# Patient Record
Sex: FEMALE | Race: WHITE | Employment: PART TIME | URBAN - METROPOLITAN AREA
[De-identification: names, ages, dates, MRNs, and addresses within clinical notes are randomized per-mention and may not be internally consistent; named-entity substitution may affect disease eponyms.]

---

## 2018-04-19 VITALS
TEMPERATURE: 97 F | WEIGHT: 267 LBS | BODY MASS INDEX: 47.3 KG/M2 | SYSTOLIC BLOOD PRESSURE: 130 MMHG | HEART RATE: 80 BPM | DIASTOLIC BLOOD PRESSURE: 90 MMHG

## 2018-04-19 RX ORDER — SERTRALINE HYDROCHLORIDE 100 MG/1
100 TABLET, FILM COATED ORAL DAILY
COMMUNITY
End: 2019-01-13

## 2018-12-03 ENCOUNTER — TELEPHONE (OUTPATIENT)
Dept: PRIMARY CARE CLINIC | Age: 48
End: 2018-12-03

## 2018-12-03 NOTE — TELEPHONE ENCOUNTER
Patient called requesting labs written up so she can schedule an appointment and won't have to come twice.     Please notify if  approved so she can get them done

## 2018-12-04 DIAGNOSIS — Z00.00 WELLNESS EXAMINATION: Primary | ICD-10-CM

## 2019-01-13 ENCOUNTER — HOSPITAL ENCOUNTER (EMERGENCY)
Age: 49
Discharge: HOME | End: 2019-01-13
Attending: EMERGENCY MEDICINE
Payer: COMMERCIAL

## 2019-01-13 ENCOUNTER — APPOINTMENT (OUTPATIENT)
Dept: GENERAL RADIOLOGY | Age: 49
End: 2019-01-13
Payer: COMMERCIAL

## 2019-01-13 VITALS
DIASTOLIC BLOOD PRESSURE: 85 MMHG | TEMPERATURE: 98.7 F | RESPIRATION RATE: 18 BRPM | SYSTOLIC BLOOD PRESSURE: 127 MMHG | OXYGEN SATURATION: 97 % | HEART RATE: 70 BPM | WEIGHT: 260 LBS | HEIGHT: 63 IN | BODY MASS INDEX: 46.07 KG/M2

## 2019-01-13 DIAGNOSIS — R05.9 COUGH: ICD-10-CM

## 2019-01-13 DIAGNOSIS — J43.9 PULMONARY EMPHYSEMA, UNSPECIFIED EMPHYSEMA TYPE (HCC): ICD-10-CM

## 2019-01-13 DIAGNOSIS — J40 BRONCHITIS: Primary | ICD-10-CM

## 2019-01-13 LAB
ALBUMIN SERPL-MCNC: 3.6 G/DL (ref 3.5–5.2)
ALP BLD-CCNC: 94 U/L (ref 35–104)
ALT SERPL-CCNC: 11 U/L (ref 0–32)
AMYLASE: 44 U/L (ref 20–100)
ANION GAP SERPL CALCULATED.3IONS-SCNC: 13 MMOL/L (ref 7–16)
AST SERPL-CCNC: 21 U/L (ref 0–31)
B.E.: 3.6 MMOL/L (ref -3–3)
BILIRUB SERPL-MCNC: 0.3 MG/DL (ref 0–1.2)
BILIRUBIN DIRECT: <0.2 MG/DL (ref 0–0.3)
BILIRUBIN, INDIRECT: NORMAL MG/DL (ref 0–1)
BUN BLDV-MCNC: 12 MG/DL (ref 6–20)
CALCIUM SERPL-MCNC: 8.8 MG/DL (ref 8.6–10.2)
CHLORIDE BLD-SCNC: 101 MMOL/L (ref 98–107)
CK MB: <1 NG/ML (ref 0–4.3)
CO2: 24 MMOL/L (ref 22–29)
CREAT SERPL-MCNC: 0.7 MG/DL (ref 0.5–1)
D DIMER: 236 NG/ML DDU
DELIVERY SYSTEMS: ABNORMAL
DEVICE: ABNORMAL
EKG ATRIAL RATE: 65 BPM
EKG P AXIS: 20 DEGREES
EKG P-R INTERVAL: 134 MS
EKG Q-T INTERVAL: 404 MS
EKG QRS DURATION: 86 MS
EKG QTC CALCULATION (BAZETT): 420 MS
EKG R AXIS: 62 DEGREES
EKG T AXIS: 50 DEGREES
EKG VENTRICULAR RATE: 65 BPM
FIO2 ARTERIAL: 4
GFR AFRICAN AMERICAN: >60
GFR NON-AFRICAN AMERICAN: >60 ML/MIN/1.73
GLUCOSE BLD-MCNC: 144 MG/DL (ref 74–99)
HCO3 ARTERIAL: 27.6 MMOL/L (ref 22–26)
HCT VFR BLD CALC: 48.1 % (ref 34–48)
HEMOGLOBIN: 16 G/DL (ref 11.5–15.5)
LACTIC ACID: 1.3 MMOL/L (ref 0.5–2.2)
LIPASE: 25 U/L (ref 13–60)
MCH RBC QN AUTO: 30.4 PG (ref 26–35)
MCHC RBC AUTO-ENTMCNC: 33.3 % (ref 32–34.5)
MCV RBC AUTO: 91.3 FL (ref 80–99.9)
O2 SATURATION: 99.6 % (ref 92–98.5)
OPERATOR ID: 377
PCO2 ARTERIAL: 38.7 MMHG (ref 35–45)
PDW BLD-RTO: 12.4 FL (ref 11.5–15)
PH BLOOD GAS: 7.46 (ref 7.35–7.45)
PLATELET # BLD: 326 E9/L (ref 130–450)
PMV BLD AUTO: 9.5 FL (ref 7–12)
PO2 ARTERIAL: 171.9 MMHG (ref 80–100)
POTASSIUM SERPL-SCNC: 4 MMOL/L (ref 3.5–5)
PRO-BNP: 44 PG/ML (ref 0–125)
RBC # BLD: 5.27 E12/L (ref 3.5–5.5)
SODIUM BLD-SCNC: 138 MMOL/L (ref 132–146)
SOURCE, BLOOD GAS: ABNORMAL
TOTAL CK: 74 U/L (ref 20–180)
TOTAL PROTEIN: 7.3 G/DL (ref 6.4–8.3)
TROPONIN: <0.01 NG/ML (ref 0–0.03)
WBC # BLD: 8.6 E9/L (ref 4.5–11.5)

## 2019-01-13 PROCEDURE — 85378 FIBRIN DEGRADE SEMIQUANT: CPT

## 2019-01-13 PROCEDURE — 96365 THER/PROPH/DIAG IV INF INIT: CPT

## 2019-01-13 PROCEDURE — 93005 ELECTROCARDIOGRAM TRACING: CPT | Performed by: EMERGENCY MEDICINE

## 2019-01-13 PROCEDURE — 82150 ASSAY OF AMYLASE: CPT

## 2019-01-13 PROCEDURE — 80048 BASIC METABOLIC PNL TOTAL CA: CPT

## 2019-01-13 PROCEDURE — 83605 ASSAY OF LACTIC ACID: CPT

## 2019-01-13 PROCEDURE — 82803 BLOOD GASES ANY COMBINATION: CPT

## 2019-01-13 PROCEDURE — 82553 CREATINE MB FRACTION: CPT

## 2019-01-13 PROCEDURE — 6360000002 HC RX W HCPCS: Performed by: EMERGENCY MEDICINE

## 2019-01-13 PROCEDURE — 96375 TX/PRO/DX INJ NEW DRUG ADDON: CPT

## 2019-01-13 PROCEDURE — 84484 ASSAY OF TROPONIN QUANT: CPT

## 2019-01-13 PROCEDURE — 83880 ASSAY OF NATRIURETIC PEPTIDE: CPT

## 2019-01-13 PROCEDURE — 2580000003 HC RX 258: Performed by: EMERGENCY MEDICINE

## 2019-01-13 PROCEDURE — 71045 X-RAY EXAM CHEST 1 VIEW: CPT

## 2019-01-13 PROCEDURE — 82550 ASSAY OF CK (CPK): CPT

## 2019-01-13 PROCEDURE — 6370000000 HC RX 637 (ALT 250 FOR IP): Performed by: EMERGENCY MEDICINE

## 2019-01-13 PROCEDURE — 36600 WITHDRAWAL OF ARTERIAL BLOOD: CPT

## 2019-01-13 PROCEDURE — 94640 AIRWAY INHALATION TREATMENT: CPT

## 2019-01-13 PROCEDURE — 80076 HEPATIC FUNCTION PANEL: CPT

## 2019-01-13 PROCEDURE — 83690 ASSAY OF LIPASE: CPT

## 2019-01-13 PROCEDURE — 36415 COLL VENOUS BLD VENIPUNCTURE: CPT

## 2019-01-13 PROCEDURE — 99284 EMERGENCY DEPT VISIT MOD MDM: CPT

## 2019-01-13 PROCEDURE — 87040 BLOOD CULTURE FOR BACTERIA: CPT

## 2019-01-13 PROCEDURE — 85027 COMPLETE CBC AUTOMATED: CPT

## 2019-01-13 PROCEDURE — 96366 THER/PROPH/DIAG IV INF ADDON: CPT

## 2019-01-13 RX ORDER — IPRATROPIUM BROMIDE AND ALBUTEROL SULFATE 2.5; .5 MG/3ML; MG/3ML
1 SOLUTION RESPIRATORY (INHALATION)
Status: COMPLETED | OUTPATIENT
Start: 2019-01-13 | End: 2019-01-13

## 2019-01-13 RX ORDER — METHYLPREDNISOLONE SODIUM SUCCINATE 125 MG/2ML
125 INJECTION, POWDER, LYOPHILIZED, FOR SOLUTION INTRAMUSCULAR; INTRAVENOUS ONCE
Status: COMPLETED | OUTPATIENT
Start: 2019-01-13 | End: 2019-01-13

## 2019-01-13 RX ORDER — SODIUM CHLORIDE 9 MG/ML
1000 INJECTION, SOLUTION INTRAVENOUS ONCE
Status: COMPLETED | OUTPATIENT
Start: 2019-01-13 | End: 2019-01-13

## 2019-01-13 RX ORDER — LEVOFLOXACIN 500 MG/1
500 TABLET, FILM COATED ORAL DAILY
Qty: 7 TABLET | Refills: 0 | Status: SHIPPED | OUTPATIENT
Start: 2019-01-13 | End: 2019-01-23

## 2019-01-13 RX ORDER — METHYLPREDNISOLONE 4 MG/1
TABLET ORAL
Qty: 1 KIT | Status: SHIPPED | OUTPATIENT
Start: 2019-01-13 | End: 2019-01-19

## 2019-01-13 RX ORDER — ACETAMINOPHEN AND CODEINE PHOSPHATE 300; 30 MG/1; MG/1
1-2 TABLET ORAL EVERY 4 HOURS PRN
Qty: 21 TABLET | Refills: 0 | Status: SHIPPED | OUTPATIENT
Start: 2019-01-13 | End: 2019-01-16

## 2019-01-13 RX ORDER — KETOROLAC TROMETHAMINE 30 MG/ML
30 INJECTION, SOLUTION INTRAMUSCULAR; INTRAVENOUS ONCE
Status: COMPLETED | OUTPATIENT
Start: 2019-01-13 | End: 2019-01-13

## 2019-01-13 RX ORDER — ALBUTEROL SULFATE 90 UG/1
2 AEROSOL, METERED RESPIRATORY (INHALATION) EVERY 4 HOURS PRN
Qty: 1 INHALER | Status: ON HOLD | OUTPATIENT
Start: 2019-01-13 | End: 2020-04-12

## 2019-01-13 RX ORDER — ONDANSETRON 2 MG/ML
4 INJECTION INTRAMUSCULAR; INTRAVENOUS ONCE
Status: COMPLETED | OUTPATIENT
Start: 2019-01-13 | End: 2019-01-13

## 2019-01-13 RX ORDER — MAGNESIUM SULFATE IN WATER 40 MG/ML
2 INJECTION, SOLUTION INTRAVENOUS ONCE
Status: COMPLETED | OUTPATIENT
Start: 2019-01-13 | End: 2019-01-13

## 2019-01-13 RX ADMIN — ONDANSETRON 4 MG: 2 INJECTION INTRAMUSCULAR; INTRAVENOUS at 21:21

## 2019-01-13 RX ADMIN — METHYLPREDNISOLONE SODIUM SUCCINATE 125 MG: 125 INJECTION, POWDER, FOR SOLUTION INTRAMUSCULAR; INTRAVENOUS at 21:21

## 2019-01-13 RX ADMIN — IPRATROPIUM BROMIDE AND ALBUTEROL SULFATE 1 AMPULE: .5; 3 SOLUTION RESPIRATORY (INHALATION) at 21:26

## 2019-01-13 RX ADMIN — IPRATROPIUM BROMIDE AND ALBUTEROL SULFATE 1 AMPULE: .5; 3 SOLUTION RESPIRATORY (INHALATION) at 21:20

## 2019-01-13 RX ADMIN — KETOROLAC TROMETHAMINE 30 MG: 30 INJECTION, SOLUTION INTRAMUSCULAR; INTRAVENOUS at 22:58

## 2019-01-13 RX ADMIN — MAGNESIUM SULFATE HEPTAHYDRATE 2 G: 40 INJECTION, SOLUTION INTRAVENOUS at 21:21

## 2019-01-13 RX ADMIN — SODIUM CHLORIDE 1000 ML: 9 INJECTION, SOLUTION INTRAVENOUS at 21:21

## 2019-01-13 RX ADMIN — IPRATROPIUM BROMIDE AND ALBUTEROL SULFATE 1 AMPULE: .5; 3 SOLUTION RESPIRATORY (INHALATION) at 21:21

## 2019-01-13 ASSESSMENT — ENCOUNTER SYMPTOMS
WHEEZING: 1
EYE REDNESS: 0
SINUS PRESSURE: 0
EYE PAIN: 0
VOMITING: 0
BACK PAIN: 0
NAUSEA: 0
SORE THROAT: 0
DIARRHEA: 0
EYE DISCHARGE: 0
SINUS CONGESTION: 0
ABDOMINAL DISTENTION: 0
COUGH: 1
RHINORRHEA: 0
SHORTNESS OF BREATH: 0

## 2019-01-13 ASSESSMENT — PAIN DESCRIPTION - LOCATION: LOCATION: CHEST

## 2019-01-13 ASSESSMENT — PAIN SCALES - GENERAL
PAINLEVEL_OUTOF10: 8
PAINLEVEL_OUTOF10: 8

## 2019-01-13 ASSESSMENT — PAIN DESCRIPTION - PAIN TYPE: TYPE: ACUTE PAIN

## 2019-01-19 LAB — CULTURE, BLOOD 2: NORMAL

## 2019-02-12 PROBLEM — R05.9 COUGH: Status: RESOLVED | Noted: 2019-01-13 | Resolved: 2019-02-12

## 2019-06-06 ENCOUNTER — OFFICE VISIT (OUTPATIENT)
Dept: PRIMARY CARE CLINIC | Age: 49
End: 2019-06-06
Payer: COMMERCIAL

## 2019-06-06 VITALS
WEIGHT: 260 LBS | TEMPERATURE: 97.8 F | BODY MASS INDEX: 46.06 KG/M2 | DIASTOLIC BLOOD PRESSURE: 80 MMHG | HEART RATE: 82 BPM | SYSTOLIC BLOOD PRESSURE: 110 MMHG

## 2019-06-06 DIAGNOSIS — Z12.11 SCREENING FOR COLON CANCER: ICD-10-CM

## 2019-06-06 DIAGNOSIS — F17.210 CIGARETTE NICOTINE DEPENDENCE WITHOUT COMPLICATION: ICD-10-CM

## 2019-06-06 DIAGNOSIS — M54.12 CERVICAL RADICULOPATHY: ICD-10-CM

## 2019-06-06 DIAGNOSIS — Z12.39 SCREENING FOR BREAST CANCER: Primary | ICD-10-CM

## 2019-06-06 DIAGNOSIS — R05.3 CHRONIC COUGH: ICD-10-CM

## 2019-06-06 DIAGNOSIS — J43.9 PULMONARY EMPHYSEMA, UNSPECIFIED EMPHYSEMA TYPE (HCC): ICD-10-CM

## 2019-06-06 PROCEDURE — G8417 CALC BMI ABV UP PARAM F/U: HCPCS | Performed by: FAMILY MEDICINE

## 2019-06-06 PROCEDURE — G8926 SPIRO NO PERF OR DOC: HCPCS | Performed by: FAMILY MEDICINE

## 2019-06-06 PROCEDURE — G8427 DOCREV CUR MEDS BY ELIG CLIN: HCPCS | Performed by: FAMILY MEDICINE

## 2019-06-06 PROCEDURE — 3023F SPIROM DOC REV: CPT | Performed by: FAMILY MEDICINE

## 2019-06-06 PROCEDURE — 96160 PT-FOCUSED HLTH RISK ASSMT: CPT | Performed by: FAMILY MEDICINE

## 2019-06-06 PROCEDURE — 4004F PT TOBACCO SCREEN RCVD TLK: CPT | Performed by: FAMILY MEDICINE

## 2019-06-06 PROCEDURE — 99214 OFFICE O/P EST MOD 30 MIN: CPT | Performed by: FAMILY MEDICINE

## 2019-06-06 PROCEDURE — 3017F COLORECTAL CA SCREEN DOC REV: CPT | Performed by: FAMILY MEDICINE

## 2019-06-06 RX ORDER — AMOXICILLIN AND CLAVULANATE POTASSIUM 875; 125 MG/1; MG/1
1 TABLET, FILM COATED ORAL 2 TIMES DAILY
Qty: 14 TABLET | Refills: 0 | Status: SHIPPED | OUTPATIENT
Start: 2019-06-06 | End: 2019-06-13

## 2019-06-06 RX ORDER — DEXTROMETHORPHAN HYDROBROMIDE AND PROMETHAZINE HYDROCHLORIDE 15; 6.25 MG/5ML; MG/5ML
5 SYRUP ORAL 4 TIMES DAILY PRN
Qty: 240 ML | Refills: 0 | Status: SHIPPED | OUTPATIENT
Start: 2019-06-06 | End: 2019-06-13

## 2019-06-06 SDOH — HEALTH STABILITY: MENTAL HEALTH: HOW OFTEN DO YOU HAVE A DRINK CONTAINING ALCOHOL?: MONTHLY OR LESS

## 2019-06-06 SDOH — HEALTH STABILITY: MENTAL HEALTH: HOW MANY STANDARD DRINKS CONTAINING ALCOHOL DO YOU HAVE ON A TYPICAL DAY?: 1 OR 2

## 2019-06-06 ASSESSMENT — PATIENT HEALTH QUESTIONNAIRE - PHQ9
7. TROUBLE CONCENTRATING ON THINGS, SUCH AS READING THE NEWSPAPER OR WATCHING TELEVISION: 0
SUM OF ALL RESPONSES TO PHQ9 QUESTIONS 1 & 2: 6
5. POOR APPETITE OR OVEREATING: 1
3. TROUBLE FALLING OR STAYING ASLEEP: 1
2. FEELING DOWN, DEPRESSED OR HOPELESS: 3
10. IF YOU CHECKED OFF ANY PROBLEMS, HOW DIFFICULT HAVE THESE PROBLEMS MADE IT FOR YOU TO DO YOUR WORK, TAKE CARE OF THINGS AT HOME, OR GET ALONG WITH OTHER PEOPLE: 0
SUM OF ALL RESPONSES TO PHQ QUESTIONS 1-9: 12
SUM OF ALL RESPONSES TO PHQ QUESTIONS 1-9: 12
4. FEELING TIRED OR HAVING LITTLE ENERGY: 3
1. LITTLE INTEREST OR PLEASURE IN DOING THINGS: 3
6. FEELING BAD ABOUT YOURSELF - OR THAT YOU ARE A FAILURE OR HAVE LET YOURSELF OR YOUR FAMILY DOWN: 1
9. THOUGHTS THAT YOU WOULD BE BETTER OFF DEAD, OR OF HURTING YOURSELF: 0
8. MOVING OR SPEAKING SO SLOWLY THAT OTHER PEOPLE COULD HAVE NOTICED. OR THE OPPOSITE, BEING SO FIGETY OR RESTLESS THAT YOU HAVE BEEN MOVING AROUND A LOT MORE THAN USUAL: 0

## 2019-06-06 ASSESSMENT — ENCOUNTER SYMPTOMS
GASTROINTESTINAL NEGATIVE: 1
ALLERGIC/IMMUNOLOGIC NEGATIVE: 1
EYES NEGATIVE: 1
COUGH: 1

## 2019-06-06 NOTE — PATIENT INSTRUCTIONS
Take the medication as prescribed  Go to [facility] smoking program at [facility]  Low-sodium low-fat weight reduction diet  Force fluids  .  X-ray and lab work done  Warm compresses to the neck  Return to clinic earlier if any problems

## 2019-06-06 NOTE — PROGRESS NOTES
OFFICE PROGRESS NOTE      SUBJECTIVE:        Patient ID:   Delaney Pelletier is a 47 y.o. female who presents for   Chief Complaint   Patient presents with    Arm Pain     C/o intermittent Lt arm pain with heavy sensation x 1.5 weeks    Cough     C/o a productive cough x 1 month. HPI:   Complaining of cough  Complaining of left arm pain starting from the neck  Complaining of neck pain  Still continues to smoke  Does not follow the diet doesn't exercise  Patient did not follow-up in the office  She keeps on going to the emergency room were normal on          Prior to Admission medications    Medication Sig Start Date End Date Taking? Authorizing Provider   amoxicillin-clavulanate (AUGMENTIN) 875-125 MG per tablet Take 1 tablet by mouth 2 times daily for 7 days 6/6/19 6/13/19 Yes Robyn Felty, MD   promethazine-dextromethorphan (PROMETHAZINE-DM) 6.25-15 MG/5ML syrup Take 5 mLs by mouth 4 times daily as needed for Cough 6/6/19 6/13/19 Yes Robyn Felty, MD   albuterol sulfate HFA (PROVENTIL HFA) 108 (90 Base) MCG/ACT inhaler Inhale 2 puffs into the lungs every 4 hours as needed for Wheezing DISPENSE SPACER IF INSURANCE COVERS 1/13/19 1/13/20 Yes Angie Woodward MD        Social History     Socioeconomic History    Marital status: Single     Spouse name: None    Number of children: None    Years of education: None    Highest education level: None   Occupational History    None   Social Needs    Financial resource strain: None    Food insecurity:     Worry: None     Inability: None    Transportation needs:     Medical: None     Non-medical: None   Tobacco Use    Smoking status: Current Every Day Smoker     Packs/day: 0.50     Years: 28.00     Pack years: 14.00     Types: Cigarettes    Smokeless tobacco: Never Used   Substance and Sexual Activity    Alcohol use:  Yes     Alcohol/week: 0.6 oz     Types: 1 Cans of beer per week     Frequency: Monthly or less     Drinks per session: 1 or 2     Binge frequency: Never     Comment: occasionally    Drug use: No    Sexual activity: Not Currently     Partners: Male   Lifestyle    Physical activity:     Days per week: None     Minutes per session: None    Stress: None   Relationships    Social connections:     Talks on phone: None     Gets together: None     Attends [employer] service: None     Active member of club or organization: None     Attends meetings of clubs or organizations: None     Relationship status: None    Intimate partner violence:     Fear of current or ex partner: None     Emotionally abused: None     Physically abused: None     Forced sexual activity: None   Other Topics Concern    None   Social History Narrative    None       I have reviewed Luh's allergies, medications, problem list, medical, social and family history and have updated as needed in the electronic medical record  Review Of Systems:    Review of Systems   Constitutional: Negative. HENT: Negative. Eyes: Negative. Respiratory: Positive for cough. Cardiovascular: Negative. Gastrointestinal: Negative. Endocrine: Negative. Musculoskeletal: Positive for neck pain. Skin: Negative. Allergic/Immunologic: Negative. Neurological: Positive for numbness. Hematological: Negative. Psychiatric/Behavioral: Negative. OBJECTIVE:     VS:  Wt Readings from Last 3 Encounters:   06/06/19 260 lb (117.9 kg)   01/13/19 260 lb (117.9 kg)   02/05/18 267 lb (121.1 kg)     Temp Readings from Last 3 Encounters:   06/06/19 97.8 °F (36.6 °C) (Oral)   01/13/19 98.7 °F (37.1 °C) (Oral)   02/05/18 97 °F (36.1 °C)     BP Readings from Last 3 Encounters:   06/06/19 110/80   01/13/19 127/85   02/05/18 (!) 130/90        Physical Exam   Constitutional: She is oriented to person, place, and time. She appears well-developed and well-nourished. HENT:   Head: Normocephalic and atraumatic.    Mouth/Throat: Oropharynx is clear and moist.   Eyes: Pupils are equal, round, and reactive to light. Conjunctivae are normal.   Neck: Normal range of motion. Neck supple. Cardiovascular: Normal rate, regular rhythm, normal heart sounds and intact distal pulses. Pulmonary/Chest: Effort normal. She has wheezes. She has rales. Abdominal: Soft. Bowel sounds are normal.   Musculoskeletal: Normal range of motion. She exhibits tenderness. Neurological: She is alert and oriented to person, place, and time. Skin: Skin is warm and dry. Psychiatric: Thought content normal.          Labs :    Lab Results   Component Value Date    WBC 8.6 01/13/2019    HGB 16.0 (H) 01/13/2019    HCT 48.1 (H) 01/13/2019     01/13/2019    CHOL 192 05/02/2014    TRIG 89 05/02/2014    HDL 62 05/02/2014    ALT 11 01/13/2019    AST 21 01/13/2019     01/13/2019    K 4.0 01/13/2019     01/13/2019    CREATININE 0.7 01/13/2019    BUN 12 01/13/2019    CO2 24 01/13/2019    TSH 1.250 05/02/2014     Lab Results   Component Value Date    COLORU Yellow 01/30/2018    NITRU Negative 01/30/2018    GLUCOSEU Negative 01/30/2018    KETUA Negative 01/30/2018    UROBILINOGEN 0.2 01/30/2018    BILIRUBINUR Negative 01/30/2018     No results found for: PSA, CEA, , [id number],       Controlled Substances Monitoring:                                    ASSESSMENT     Patient Active Problem List    Diagnosis Date Noted    Bronchitis 01/13/2019    Pulmonary emphysema ([facility] [address] 01/13/2019        Diagnosis:     ICD-10-CM    1. Screening for breast cancer Z12.31 CURT DIGITAL SCREENING AUGMENTED BILATERAL   2. Screening for colon cancer Z12.11 POCT Fecal Immunochemical Test (FIT)   3. Pulmonary emphysema, unspecified emphysema type (HCC) J43.9 XR CHEST (SINGLE VIEW FRONTAL)     CBC WITH AUTO DIFFERENTIAL     COMPREHENSIVE METABOLIC PANEL     LIPID PANEL   4. Cervical radiculopathy M54.12 XR CERVICAL SPINE (4-5 VIEWS)   5.  Chronic cough R05 XR CHEST (SINGLE VIEW FRONTAL)     CBC WITH AUTO DIFFERENTIAL   6. Cigarette nicotine dependence without complication E12.089 XR CHEST (SINGLE VIEW FRONTAL)       PLAN:   Augmentin  Present and DM 1 take from full 4 times a day  Weight reduction  Quit smoking program    Force fluids  Warm compresses to the neck  Return to clinic earlier if any problems  Quality & Risk Score Accuracy    Visit Dx:  J43.9 - Pulmonary emphysema, unspecified emphysema type ([address]  Assessment and plan:  Stable based upon symptoms and exam. Continue current treatment plan and follow up at least yearly. Last edited 06/06/19 15:13 EDT by Rafi Murphy MD           Patient Instructions   Take the medication as prescribed  [facility]  Low-sodium low-fat weight reduction diet  Force fluids  . X-ray and lab work done  Warm compresses to the neck  Return to clinic earlier if any problems      Return in about 1 week (around 6/13/2019). Ever Napier reviewed my findings and recommendations with Kelby Macdonald.     Electronicallysigned by Rafi Murphy MD on 6/6/19 at 3:06 PM

## 2019-06-13 ENCOUNTER — TELEPHONE (OUTPATIENT)
Dept: ADMINISTRATIVE | Age: 49
End: 2019-06-13

## 2020-04-11 ENCOUNTER — HOSPITAL ENCOUNTER (INPATIENT)
Age: 50
LOS: 5 days | Discharge: HOME HEALTH | DRG: 024 | End: 2020-04-16
Attending: EMERGENCY MEDICINE | Admitting: INTERNAL MEDICINE
Payer: COMMERCIAL

## 2020-04-11 ENCOUNTER — APPOINTMENT (OUTPATIENT)
Dept: CT IMAGING | Age: 50
DRG: 024 | End: 2020-04-11
Payer: COMMERCIAL

## 2020-04-11 ENCOUNTER — HOSPITAL ENCOUNTER (EMERGENCY)
Age: 50
Discharge: ANOTHER HOSPITAL | End: 2020-04-11
Attending: EMERGENCY MEDICINE
Payer: COMMERCIAL

## 2020-04-11 VITALS
HEART RATE: 88 BPM | RESPIRATION RATE: 16 BRPM | SYSTOLIC BLOOD PRESSURE: 151 MMHG | TEMPERATURE: 98 F | OXYGEN SATURATION: 96 % | DIASTOLIC BLOOD PRESSURE: 86 MMHG | WEIGHT: 254 LBS | BODY MASS INDEX: 44.99 KG/M2

## 2020-04-11 PROBLEM — Z72.0 TOBACCO ABUSE: Chronic | Status: ACTIVE | Noted: 2020-04-11

## 2020-04-11 PROBLEM — I63.9 CVA (CEREBRAL VASCULAR ACCIDENT) (HCC): Status: ACTIVE | Noted: 2020-04-11

## 2020-04-11 PROBLEM — I63.9 ACUTE CVA (CEREBROVASCULAR ACCIDENT) (HCC): Status: ACTIVE | Noted: 2020-04-11

## 2020-04-11 PROBLEM — J40 BRONCHITIS: Status: RESOLVED | Noted: 2019-01-13 | Resolved: 2020-04-11

## 2020-04-11 LAB
ALBUMIN SERPL-MCNC: 3.2 G/DL (ref 3.5–5.2)
ALP BLD-CCNC: 94 U/L (ref 35–104)
ALT SERPL-CCNC: 19 U/L (ref 0–32)
ANION GAP SERPL CALCULATED.3IONS-SCNC: 14 MMOL/L (ref 7–16)
AST SERPL-CCNC: 20 U/L (ref 0–31)
BILIRUB SERPL-MCNC: 0.4 MG/DL (ref 0–1.2)
BUN BLDV-MCNC: 10 MG/DL (ref 6–20)
CALCIUM SERPL-MCNC: 9.2 MG/DL (ref 8.6–10.2)
CHLORIDE BLD-SCNC: 103 MMOL/L (ref 98–107)
CHP ED QC CHECK: YES
CO2: 19 MMOL/L (ref 22–29)
CREAT SERPL-MCNC: 0.8 MG/DL (ref 0.5–1)
GFR AFRICAN AMERICAN: >60
GFR NON-AFRICAN AMERICAN: >60 ML/MIN/1.73
GLUCOSE BLD-MCNC: 104 MG/DL (ref 74–99)
GLUCOSE BLD-MCNC: 99 MG/DL
HCT VFR BLD CALC: 51.7 % (ref 34–48)
HEMOGLOBIN: 16.2 G/DL (ref 11.5–15.5)
INR BLD: 1
MCH RBC QN AUTO: 28.4 PG (ref 26–35)
MCHC RBC AUTO-ENTMCNC: 31.3 % (ref 32–34.5)
MCV RBC AUTO: 90.7 FL (ref 80–99.9)
METER GLUCOSE: 99 MG/DL (ref 74–99)
PDW BLD-RTO: 12.9 FL (ref 11.5–15)
PLATELET # BLD: 497 E9/L (ref 130–450)
PMV BLD AUTO: 9.3 FL (ref 7–12)
POTASSIUM SERPL-SCNC: 4.3 MMOL/L (ref 3.5–5)
PROTHROMBIN TIME: 11.6 SEC (ref 9.3–12.4)
RBC # BLD: 5.7 E12/L (ref 3.5–5.5)
SODIUM BLD-SCNC: 136 MMOL/L (ref 132–146)
TOTAL PROTEIN: 7.9 G/DL (ref 6.4–8.3)
TROPONIN: <0.01 NG/ML (ref 0–0.03)
WBC # BLD: 9.2 E9/L (ref 4.5–11.5)

## 2020-04-11 PROCEDURE — 96374 THER/PROPH/DIAG INJ IV PUSH: CPT

## 2020-04-11 PROCEDURE — 70450 CT HEAD/BRAIN W/O DYE: CPT

## 2020-04-11 PROCEDURE — 82962 GLUCOSE BLOOD TEST: CPT

## 2020-04-11 PROCEDURE — 93005 ELECTROCARDIOGRAM TRACING: CPT | Performed by: EMERGENCY MEDICINE

## 2020-04-11 PROCEDURE — 84484 ASSAY OF TROPONIN QUANT: CPT

## 2020-04-11 PROCEDURE — 6360000002 HC RX W HCPCS: Performed by: EMERGENCY MEDICINE

## 2020-04-11 PROCEDURE — 2060000000 HC ICU INTERMEDIATE R&B

## 2020-04-11 PROCEDURE — 99285 EMERGENCY DEPT VISIT HI MDM: CPT

## 2020-04-11 PROCEDURE — 85610 PROTHROMBIN TIME: CPT

## 2020-04-11 PROCEDURE — G0384 LEV 5 HOSP TYPE B ED VISIT: HCPCS

## 2020-04-11 PROCEDURE — 85027 COMPLETE CBC AUTOMATED: CPT

## 2020-04-11 PROCEDURE — 80053 COMPREHEN METABOLIC PANEL: CPT

## 2020-04-11 RX ORDER — FENTANYL CITRATE 50 UG/ML
50 INJECTION, SOLUTION INTRAMUSCULAR; INTRAVENOUS ONCE
Status: COMPLETED | OUTPATIENT
Start: 2020-04-11 | End: 2020-04-11

## 2020-04-11 RX ADMIN — FENTANYL CITRATE 50 MCG: 50 INJECTION, SOLUTION INTRAMUSCULAR; INTRAVENOUS at 22:20

## 2020-04-11 ASSESSMENT — ENCOUNTER SYMPTOMS
SHORTNESS OF BREATH: 0
ABDOMINAL DISTENTION: 0
SINUS PRESSURE: 0
EYE PAIN: 0
COUGH: 0
EYE REDNESS: 0
EYE DISCHARGE: 0
PHOTOPHOBIA: 0
SHORTNESS OF BREATH: 0
BACK PAIN: 0
COLOR CHANGE: 0
CONSTIPATION: 0
NAUSEA: 0
WHEEZING: 0
EYE PAIN: 0
COUGH: 0
NAUSEA: 0
DIARRHEA: 0
DIARRHEA: 0
SORE THROAT: 0
VOMITING: 0
ABDOMINAL DISTENTION: 0
FACIAL SWELLING: 0
EYE REDNESS: 0
RHINORRHEA: 0
CHEST TIGHTNESS: 0

## 2020-04-11 ASSESSMENT — PAIN SCALES - GENERAL: PAINLEVEL_OUTOF10: 10

## 2020-04-11 NOTE — ED NOTES
Patient was advised to go to the ED at [address] [place] [address] directly. Patients daughter was here for transport. Dr. Javier Welsh in the ED at [address] [place] [address] is aware of the patient.      Camila Johnson RN  04/11/20 1547

## 2020-04-11 NOTE — ED PROVIDER NOTES
Cardiovascular:      Rate and Rhythm: Normal rate and regular rhythm. Heart sounds: Normal heart sounds. No murmur. Pulmonary:      Effort: Pulmonary effort is normal. No respiratory distress. Breath sounds: Normal breath sounds. No wheezing or rales. Abdominal:      General: Bowel sounds are normal.      Palpations: Abdomen is soft. Tenderness: There is no abdominal tenderness. There is no guarding or rebound. Skin:     General: Skin is warm and dry. Neurological:      Mental Status: She is alert and oriented to person, place, and time. Cranial Nerves: No cranial nerve deficit. Coordination: Coordination normal.      Comments: Patient has limited function of right upper extremity extending from the elbow to the hand. She has unable to perform any  strength testing, or move her fingers. She has have limited sensation over the dermatomal planes, however does note sensation of deep pain. There is notable hyper reflexivity at the right forearm and hand, as well as at the right tibia  She is ambulatory, there is no noted gait disturbance. Procedures     MDM  Number of Diagnoses or Management Options  Cerebrovascular accident (CVA), unspecified mechanism [facility]):   Diagnosis management comments: 43-year-old female history of obesity and tobacco use presenting to the emergency department with complaint of numbness tingling and weakness in right upper extremity, as well as involvement of right lower extremity. Patient's last known well was 8 AM yesterday morning. Initial NIH stroke scale in department is 3. Physical exam was also notable for hyperreflexia in affected limbs. vital signs are stable aside from mild hypertension. Based on presentation there is significant concern for recent acute CVA. Based on the limitations of this facility further work-up will be required at a full complement emergency department.   The patient was transported to this ED by her daughter, tPA Criteria*  Inclusion criteria:  - Ischemic stroke onset within 3 hours of drug administration  - Age 25 or older  - No hemorrhage or non-stroke cause of deficit on CT  - Measurable deficit on NIH Stroke Scale    Exclusion criteria: If the patient. ...  - has minor or improving symptoms  - had seizure at onset of stroke  - has had another stroke or serious head trauma within the last 3 months  - has had major surgery within the last 14 days  - has known history of intracranial hemorrhage  - has sustained systolic blood pressure >096 mmHg  - has sustained diastolic blood pressure >399 mmHg  - requires aggressive treatment is necessary to lower their blood pressure  - has symptoms suggestive of subarachnoid hemorrhage  - has had GI or urinary tract hemorrhage within the last 21 days  - has had an arterial puncture at a non-compressible site within the last 7 days  - received heparin within the last 48 hours and has an elevated PTT  - has a prothrombin time (PT) >15 seconds  - has a platelet count <464,587 uL  - serum blood glucose is <50 mg/dL or >400 mg/dL    Relative Contraindications:  - NIH Stroke score >22  - Patient's CT shows evidence of large MCA territory infarction (>1/3 the MCA territory)    *Located within [facility] Policy Paper    --------------------------------------------- PAST HISTORY ---------------------------------------------  Past Medical History:  has a past medical history of Pulmonary emphysema ([facility] [address] Past Surgical History:  has a past surgical history that includes Cholecystectomy;  section; Tubal ligation; and Tubal ligation. Social History:  reports that she has been smoking cigarettes. She has a 14.00 pack-year smoking history. She has never used smokeless tobacco. She reports current alcohol use of about 1.0 standard drinks of alcohol per week. She reports that she does not use drugs.     Family History: family history includes Diabetes in her mother; High Blood Pressure in her mother; they are aware of the specific conditions for emergent return, as well as the importance of follow-up. New Prescriptions    No medications on file       Diagnosis:  1. Cerebrovascular accident (CVA), unspecified mechanism ([facility] [address]        Disposition:  Patient's disposition: Discharge to home  Patient's condition is stable.               Chidi King DO  Resident  04/11/20 7662

## 2020-04-12 ENCOUNTER — APPOINTMENT (OUTPATIENT)
Dept: CT IMAGING | Age: 50
DRG: 024 | End: 2020-04-12
Payer: COMMERCIAL

## 2020-04-12 PROBLEM — I65.22 SYMPTOMATIC STENOSIS OF LEFT CAROTID ARTERY: Status: ACTIVE | Noted: 2020-04-12

## 2020-04-12 PROBLEM — E78.5 HYPERLIPIDEMIA: Status: ACTIVE | Noted: 2020-04-12

## 2020-04-12 PROBLEM — E66.01 MORBID OBESITY WITH BMI OF 45.0-49.9, ADULT (HCC): Chronic | Status: ACTIVE | Noted: 2020-04-12

## 2020-04-12 LAB
ANION GAP SERPL CALCULATED.3IONS-SCNC: 13 MMOL/L (ref 7–16)
APTT: 34.2 SEC (ref 24.5–35.1)
APTT: 43.2 SEC (ref 24.5–35.1)
BUN BLDV-MCNC: 12 MG/DL (ref 6–20)
CALCIUM SERPL-MCNC: 9.1 MG/DL (ref 8.6–10.2)
CHLORIDE BLD-SCNC: 104 MMOL/L (ref 98–107)
CHOLESTEROL, TOTAL: 198 MG/DL (ref 0–199)
CO2: 25 MMOL/L (ref 22–29)
CREAT SERPL-MCNC: 0.9 MG/DL (ref 0.5–1)
EKG ATRIAL RATE: 68 BPM
EKG P AXIS: 65 DEGREES
EKG P-R INTERVAL: 148 MS
EKG Q-T INTERVAL: 402 MS
EKG QRS DURATION: 86 MS
EKG QTC CALCULATION (BAZETT): 427 MS
EKG R AXIS: 80 DEGREES
EKG T AXIS: 45 DEGREES
EKG VENTRICULAR RATE: 68 BPM
GFR AFRICAN AMERICAN: >60
GFR NON-AFRICAN AMERICAN: >60 ML/MIN/1.73
GLUCOSE BLD-MCNC: 113 MG/DL (ref 74–99)
HBA1C MFR BLD: 5.5 % (ref 4–5.6)
HCT VFR BLD CALC: 47.4 % (ref 34–48)
HCT VFR BLD CALC: 47.8 % (ref 34–48)
HDLC SERPL-MCNC: 41 MG/DL
HEMOGLOBIN: 14.8 G/DL (ref 11.5–15.5)
HEMOGLOBIN: 14.9 G/DL (ref 11.5–15.5)
LDL CHOLESTEROL CALCULATED: 119 MG/DL (ref 0–99)
MCH RBC QN AUTO: 27.9 PG (ref 26–35)
MCH RBC QN AUTO: 28.9 PG (ref 26–35)
MCHC RBC AUTO-ENTMCNC: 31 % (ref 32–34.5)
MCHC RBC AUTO-ENTMCNC: 31.4 % (ref 32–34.5)
MCV RBC AUTO: 90 FL (ref 80–99.9)
MCV RBC AUTO: 91.9 FL (ref 80–99.9)
PDW BLD-RTO: 13.1 FL (ref 11.5–15)
PDW BLD-RTO: 13.1 FL (ref 11.5–15)
PLATELET # BLD: 374 E9/L (ref 130–450)
PLATELET # BLD: 465 E9/L (ref 130–450)
PMV BLD AUTO: 10.1 FL (ref 7–12)
PMV BLD AUTO: 9.4 FL (ref 7–12)
POTASSIUM REFLEX MAGNESIUM: 4 MMOL/L (ref 3.5–5)
RBC # BLD: 5.16 E12/L (ref 3.5–5.5)
RBC # BLD: 5.31 E12/L (ref 3.5–5.5)
SODIUM BLD-SCNC: 142 MMOL/L (ref 132–146)
TRIGL SERPL-MCNC: 192 MG/DL (ref 0–149)
VLDLC SERPL CALC-MCNC: 38 MG/DL
WBC # BLD: 8.4 E9/L (ref 4.5–11.5)
WBC # BLD: 8.7 E9/L (ref 4.5–11.5)

## 2020-04-12 PROCEDURE — 99255 IP/OBS CONSLTJ NEW/EST HI 80: CPT | Performed by: SURGERY

## 2020-04-12 PROCEDURE — 70498 CT ANGIOGRAPHY NECK: CPT

## 2020-04-12 PROCEDURE — 2580000003 HC RX 258: Performed by: RADIOLOGY

## 2020-04-12 PROCEDURE — 36415 COLL VENOUS BLD VENIPUNCTURE: CPT

## 2020-04-12 PROCEDURE — 6370000000 HC RX 637 (ALT 250 FOR IP): Performed by: NURSE PRACTITIONER

## 2020-04-12 PROCEDURE — 6360000004 HC RX CONTRAST MEDICATION: Performed by: RADIOLOGY

## 2020-04-12 PROCEDURE — 85027 COMPLETE CBC AUTOMATED: CPT

## 2020-04-12 PROCEDURE — 97166 OT EVAL MOD COMPLEX 45 MIN: CPT

## 2020-04-12 PROCEDURE — 93010 ELECTROCARDIOGRAM REPORT: CPT | Performed by: INTERNAL MEDICINE

## 2020-04-12 PROCEDURE — 6360000002 HC RX W HCPCS: Performed by: NURSE PRACTITIONER

## 2020-04-12 PROCEDURE — 97110 THERAPEUTIC EXERCISES: CPT

## 2020-04-12 PROCEDURE — 2060000000 HC ICU INTERMEDIATE R&B

## 2020-04-12 PROCEDURE — 97161 PT EVAL LOW COMPLEX 20 MIN: CPT

## 2020-04-12 PROCEDURE — 99253 IP/OBS CNSLTJ NEW/EST LOW 45: CPT | Performed by: PSYCHIATRY & NEUROLOGY

## 2020-04-12 PROCEDURE — 6360000002 HC RX W HCPCS: Performed by: PSYCHIATRY & NEUROLOGY

## 2020-04-12 PROCEDURE — 70496 CT ANGIOGRAPHY HEAD: CPT

## 2020-04-12 PROCEDURE — 80048 BASIC METABOLIC PNL TOTAL CA: CPT

## 2020-04-12 PROCEDURE — 97535 SELF CARE MNGMENT TRAINING: CPT

## 2020-04-12 PROCEDURE — 83036 HEMOGLOBIN GLYCOSYLATED A1C: CPT

## 2020-04-12 PROCEDURE — 80061 LIPID PANEL: CPT

## 2020-04-12 PROCEDURE — 2580000003 HC RX 258: Performed by: NURSE PRACTITIONER

## 2020-04-12 PROCEDURE — 85730 THROMBOPLASTIN TIME PARTIAL: CPT

## 2020-04-12 PROCEDURE — 6370000000 HC RX 637 (ALT 250 FOR IP): Performed by: INTERNAL MEDICINE

## 2020-04-12 RX ORDER — ONDANSETRON 2 MG/ML
4 INJECTION INTRAMUSCULAR; INTRAVENOUS EVERY 6 HOURS PRN
Status: DISCONTINUED | OUTPATIENT
Start: 2020-04-12 | End: 2020-04-16 | Stop reason: HOSPADM

## 2020-04-12 RX ORDER — SODIUM CHLORIDE 0.9 % (FLUSH) 0.9 %
10 SYRINGE (ML) INJECTION PRN
Status: DISCONTINUED | OUTPATIENT
Start: 2020-04-12 | End: 2020-04-16 | Stop reason: HOSPADM

## 2020-04-12 RX ORDER — ATORVASTATIN CALCIUM 40 MG/1
80 TABLET, FILM COATED ORAL NIGHTLY
Status: DISCONTINUED | OUTPATIENT
Start: 2020-04-12 | End: 2020-04-16 | Stop reason: HOSPADM

## 2020-04-12 RX ORDER — HEPARIN SODIUM 1000 [USP'U]/ML
4000 INJECTION, SOLUTION INTRAVENOUS; SUBCUTANEOUS PRN
Status: DISCONTINUED | OUTPATIENT
Start: 2020-04-12 | End: 2020-04-15

## 2020-04-12 RX ORDER — PROMETHAZINE HYDROCHLORIDE 25 MG/1
12.5 TABLET ORAL EVERY 6 HOURS PRN
Status: DISCONTINUED | OUTPATIENT
Start: 2020-04-12 | End: 2020-04-16 | Stop reason: HOSPADM

## 2020-04-12 RX ORDER — ALBUTEROL SULFATE 0.63 MG/3ML
0.63 SOLUTION RESPIRATORY (INHALATION) EVERY 6 HOURS PRN
Status: DISCONTINUED | OUTPATIENT
Start: 2020-04-12 | End: 2020-04-16 | Stop reason: HOSPADM

## 2020-04-12 RX ORDER — ASPIRIN 81 MG/1
81 TABLET ORAL DAILY
Status: DISCONTINUED | OUTPATIENT
Start: 2020-04-12 | End: 2020-04-16 | Stop reason: HOSPADM

## 2020-04-12 RX ORDER — SODIUM CHLORIDE 0.9 % (FLUSH) 0.9 %
10 SYRINGE (ML) INJECTION
Status: COMPLETED | OUTPATIENT
Start: 2020-04-12 | End: 2020-04-12

## 2020-04-12 RX ORDER — ASPIRIN 300 MG/1
300 SUPPOSITORY RECTAL DAILY
Status: DISCONTINUED | OUTPATIENT
Start: 2020-04-12 | End: 2020-04-15

## 2020-04-12 RX ORDER — HEPARIN SODIUM 10000 [USP'U]/100ML
9 INJECTION, SOLUTION INTRAVENOUS CONTINUOUS
Status: DISCONTINUED | OUTPATIENT
Start: 2020-04-12 | End: 2020-04-15

## 2020-04-12 RX ORDER — TRAMADOL HYDROCHLORIDE 50 MG/1
50 TABLET ORAL EVERY 6 HOURS PRN
Status: DISCONTINUED | OUTPATIENT
Start: 2020-04-12 | End: 2020-04-15

## 2020-04-12 RX ORDER — ACETAMINOPHEN 325 MG/1
650 TABLET ORAL EVERY 6 HOURS PRN
Status: DISCONTINUED | OUTPATIENT
Start: 2020-04-12 | End: 2020-04-16 | Stop reason: HOSPADM

## 2020-04-12 RX ORDER — HEPARIN SODIUM 1000 [USP'U]/ML
2000 INJECTION, SOLUTION INTRAVENOUS; SUBCUTANEOUS PRN
Status: DISCONTINUED | OUTPATIENT
Start: 2020-04-12 | End: 2020-04-15

## 2020-04-12 RX ORDER — CLOPIDOGREL BISULFATE 75 MG/1
75 TABLET ORAL DAILY
Status: DISCONTINUED | OUTPATIENT
Start: 2020-04-12 | End: 2020-04-12

## 2020-04-12 RX ORDER — SODIUM CHLORIDE 0.9 % (FLUSH) 0.9 %
10 SYRINGE (ML) INJECTION EVERY 12 HOURS SCHEDULED
Status: DISCONTINUED | OUTPATIENT
Start: 2020-04-12 | End: 2020-04-16 | Stop reason: HOSPADM

## 2020-04-12 RX ADMIN — HEPARIN SODIUM 2000 UNITS: 1000 INJECTION INTRAVENOUS; SUBCUTANEOUS at 22:56

## 2020-04-12 RX ADMIN — HEPARIN SODIUM 9 UNITS/KG/HR: 10000 INJECTION, SOLUTION INTRAVENOUS at 15:55

## 2020-04-12 RX ADMIN — ATORVASTATIN CALCIUM 80 MG: 40 TABLET, FILM COATED ORAL at 02:07

## 2020-04-12 RX ADMIN — IOPAMIDOL 60 ML: 755 INJECTION, SOLUTION INTRAVENOUS at 11:11

## 2020-04-12 RX ADMIN — ACETAMINOPHEN 650 MG: 325 TABLET ORAL at 17:28

## 2020-04-12 RX ADMIN — ENOXAPARIN SODIUM 40 MG: 100 INJECTION SUBCUTANEOUS at 08:39

## 2020-04-12 RX ADMIN — Medication 10 ML: at 11:11

## 2020-04-12 RX ADMIN — ATORVASTATIN CALCIUM 80 MG: 40 TABLET, FILM COATED ORAL at 20:41

## 2020-04-12 RX ADMIN — ASPIRIN 81 MG: 81 TABLET, COATED ORAL at 08:39

## 2020-04-12 RX ADMIN — Medication 10 ML: at 20:42

## 2020-04-12 RX ADMIN — Medication 10 ML: at 08:40

## 2020-04-12 ASSESSMENT — PAIN SCALES - GENERAL
PAINLEVEL_OUTOF10: 5
PAINLEVEL_OUTOF10: 0

## 2020-04-12 NOTE — PROGRESS NOTES
Transfers Sit to stand:SUP   Stand to sit:SUP  Commode: SUP  IND   Functional Mobility SUP (no AD, to/from bathroom)  IND   Balance Sitting:     Static:  SUP    Dynamic:SUP  Standing: SUP     Activity Tolerance Fair+     Visual/  Perceptual Glasses: no    pt able to reach clock on wall    visual tracking appears WFL                Hand dominance: R  UE ROM: RUE: elbow flex WFL, shoulder flex grossly 90, wrist/digits flaccid  LUE: WFL  Strength: RUE: grossly 3/5 LUE: grossly 4-/5   Strength: R<L  Fine Motor Coordination: poor R hand    Hearing: WFL  Sensation:  c/o numbness/tingling in R hand  Tone:  WFL  Edema: none noted                            Comments:Cleared by RN to see pt. Upon arrival, patient supine in bed and agreeable to OT session. At end of session, patient supine in bed with call light and phone within reach, all lines and tubes intact. Pt would benefit from continued OT to increase functional independence and quality of life. Treatment: Pt required vc's for proper technique/safety with hand placement/body mechanics/posture for bed mobility/ADLs/functional tranfers. Pt able to stand at sink ~10 mins to increase  strength/balance/activity tolerance for ease with ADLs. Educated on [address] to increase ROM and to decrease risk for increased tone at wrist/digits. Pt appeared to have tolerated session well and appears motivated/cooperative/pleasant . Pt instructed on use of call light for assistance and fall prevention. Pt demo'ing fair understanding of education provided. Continue to educate. Eval Complexity: moderate  · History: Expanded chart review of medical records and additional review of physical, cognitive, or psychosocial history related to current functional performance  · Exam: 3+ performance deficits  · Assistance/Modification: min/mod assistance or modifications required to perform tasks. May have comorbidities that affect occupational performance.     Assessment of current

## 2020-04-12 NOTE — ED PROVIDER NOTES
mL/min/1.73    GFR African American >60     Calcium 9.2 8.6 - 10.2 mg/dL    Total Protein 7.9 6.4 - 8.3 g/dL    Alb 3.2 (L) 3.5 - 5.2 g/dL    Total Bilirubin 0.4 0.0 - 1.2 mg/dL    Alkaline Phosphatase 94 35 - 104 U/L    ALT 19 0 - 32 U/L    AST 20 0 - 31 U/L   CBC   Result Value Ref Range    WBC 9.2 4.5 - 11.5 E9/L    RBC 5.70 (H) 3.50 - 5.50 E12/L    Hemoglobin 16.2 (H) 11.5 - 15.5 g/dL    Hematocrit 51.7 (H) 34.0 - 48.0 %    MCV 90.7 80.0 - 99.9 fL    MCH 28.4 26.0 - 35.0 pg    MCHC 31.3 (L) 32.0 - 34.5 %    RDW 12.9 11.5 - 15.0 fL    Platelets 703 (H) 287 - 450 E9/L    MPV 9.3 7.0 - 12.0 fL   Troponin   Result Value Ref Range    Troponin <0.01 0.00 - 0.03 ng/mL   Protime-INR   Result Value Ref Range    Protime 11.6 9.3 - 12.4 sec    INR 1.0    POCT glucose   Result Value Ref Range    Glucose 99 mg/dL    QC OK? YES    POCT Glucose   Result Value Ref Range    Meter Glucose 99 74 - 99 mg/dL   EKG 12 Lead   Result Value Ref Range    Ventricular Rate 68 BPM    Atrial Rate 68 BPM    P-R Interval 148 ms    QRS Duration 86 ms    Q-T Interval 402 ms    QTc Calculation (Bazett) 427 ms    P Axis 65 degrees    R Axis 80 degrees    T Axis 45 degrees       RADIOLOGY:  CT Head WO Contrast   Final Result      Focal area of hypoattenuation is associated with the posterior left   frontal cortex which could indicate focal area of stroke. MRI may be   helpful for further evaluation. ALERT:  THIS IS AN ABNORMAL REPORT.          EKG: This EKG is signed and interpreted by me. Rate: 68  Rhythm: Sinus  Interpretation: no acute changes  Comparison: stable as compared to patient's most recent EKG and no previous EKG available      ------------------------- NURSING NOTES AND VITALS REVIEWED ---------------------------  Date / Time Roomed:  4/11/2020  7:57 PM  ED Bed Assignment:  01/01    The nursing notes within the ED encounter and vital signs as below have been reviewed.      Patient Vitals for the past 24 hrs:   BP Temp Pulse Resp SpO2 Weight   04/11/20 2230 -- -- -- -- 95 % --   04/11/20 2130 124/88 -- 75 18 96 % --   04/11/20 2100 -- -- -- -- 96 % --   04/11/20 2045 -- -- -- -- 96 % --   04/11/20 2030 -- -- -- -- 96 % --   04/11/20 2015 -- -- 74 -- 96 % --   04/11/20 2010 (!) 115/90 -- 78 18 96 % --   04/11/20 1645 (!) 167/100 98 °F (36.7 °C) -- 18 -- 254 lb (115.2 kg)   04/11/20 1642 -- -- 75 -- 96 % --       Oxygen Saturation Interpretation: Normal    ------------------------------------------ PROGRESS NOTES ------------------------------------------  Re-evaluation(s):  Time: 1900  Patients symptoms show no change  Repeat physical examination is not changed    Counseling:  I have spoken with the patient and discussed todays results, in addition to providing specific details for the plan of care and counseling regarding the diagnosis and prognosis. Their questions are answered at this time and they are agreeable with the plan of admission.    --------------------------------- ADDITIONAL PROVIDER NOTES ---------------------------------  Consultations:  Time: 2250. Spoke with Dr. Vika Solomon. Discussed case. They will admit the patient. This patient's ED course included: IV O2 monitoring, pulse ox, IV medications, complex medical decision making    This patient has remained hemodynamically stable during their ED course. Diagnosis:  1. Cerebrovascular accident (CVA), unspecified mechanism ([address]    2. Right arm weakness    3. Nonintractable headache, unspecified chronicity pattern, unspecified headache type        Disposition:  Patient's disposition: Admit to telemetry  Patient's condition is stable.               Ashanti Eason DO  Resident  04/11/20 8479

## 2020-04-12 NOTE — PROGRESS NOTES
Physical Therapy  Physical Therapy Initial Assessment   Name: Alma Yang  : 1970  MRN: [id number]    Referring Provider:  MANUEL Pedersen CNP    Date of Service: 2020    Evaluating PT:  Nelly Cervantes PT, DPT [id number]    Room #:  3952/7257-E  Diagnosis:  Acute CVA  PMHx/PSHx:  Pulmonary emphysema, HLD  Precautions:  RUE weakness  Equipment Needs:  None at this time    SUBJECTIVE:    Pt lives with grandmother in a 1 story home with 1 stairs to enter and 1 rail. Bed is on first floor and bath is on first floor. Pt ambulated with no AD independently PTA. 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 Pt notes her daughter assists with transportation and driving as needed. OBJECTIVE:   Initial Evaluation  Date: 2020 Treatment Goals:   AM-PAC 6 Clicks      Was pt agreeable to Eval/treatment? yes     Does pt have pain? No c/o pain     Bed Mobility  Rolling: SBA  Supine to sit: SBA  Sit to supine: SBA  Scooting: SBA  Independent   Transfers Sit to stand: SBA  Stand to sit: SBA  Stand pivot: SBA  Independent   Ambulation    150 feet with no AD SBA  300 feet no AD Independent   Stair negotiation: ascended and descended  4 steps with 1 rail SBA  12 steps 1 rail Independent   ROM BUE:  Per OT note  BLE:  WNL     Strength BUE:  Weakness to RUE. Worse distally than proximally. Please refer to OT note for further details. BLE:  5/5     Balance Sitting EOB:  SBA  Dynamic Standing:  SBA  Independent     Pt is A & O x 4  Sensation:  Pt denies numbness and tingling to extremities  Edema:  unremarkable    Patient education  Pt educated on role of PT intervention. Emphasized importance of continued use of RUE to promote return of function.     Patient response to education:   Pt verbalized understanding Pt demonstrated skill Pt requires further education in this area   yes yes no     ASSESSMENT:    Comments:  Pt found ambulating in room from bathroom and agreeable to PT intervention at this time. Pt presents with normal sensation and strength in RLE. .  Significant RUE deficits noted, but full evaluation of UE function deferred to occupational therapy. Pt performing all aspects of functional mobility at SBA and is primarily hindered by limited use of RUE, particularly the R hand. Pt would benefit from continued PT intervention to maximize functional independent and restore PLOF. Treatment:  Patient practiced and was instructed in the following treatment:     Therapeutic Activities Completed:  o Functional mobility as noted above:   - Bed mobility: performed at A. Min VC for safety. Pt did utilize bed rail which she does not have at home. - Transfer training: SBA. Min VC for safety.  - Ambulation: 100 feet at close SBA for safety. o Pt education regarding continued use of RUE for promotion of return of function of RUE. Emphasis placed on opening/closing of hand and finger opposition. Pt's/ family goals   1. Return home    Patient and or family understand(s) diagnosis, prognosis, and plan of care. yes    PLAN:    PT care will be provided in accordance with the objectives noted above. Exercises and functional mobility practice will be used as well as appropriate assistive devices or modalities to obtain goals. Patient and family education will also be administered as needed. Frequency of treatments: 2-5x/week x 1-2 weeks. Time in  0745  Time out  0805    Total Treatment Time  10 minutes     Evaluation Time includes thorough review of current medical information, gathering information on past medical history/social history and prior level of function, completion of standardized testing/informal observation of tasks, assessment of data and education on plan of care and goals.     CPT codes:  [x] Low Complexity PT evaluation 46194  [] Moderate Complexity PT evaluation 45508  [] High Complexity PT evaluation 35250  [] PT Re-evaluation 76764  [] Gait training 95265 0 minutes  []

## 2020-04-12 NOTE — CONSULTS
movements. Right pronator drift. 2-3/5 strength in the right hand. 4-/5 proximally. .    Sensory  Light touch is normal in upper and lower extremities. Pinprick is normal in upper and lower extremities. Reflexes                                           Right                      Left  Biceps                                 1+                         1+  Triceps                                1+                         1+  Patellar                                3+                         3+  Achilles                                3+                         3+  Plantar                           Downgoing                Downgoing    Right pathological reflexes: Manisha's absent. Crossed adductor absent. Left pathological reflexes: Manisha's absent. Crossed adductor absent. Coordination  Right: Finger-to-nose abnormality: Heel-to-shin normal.  Left: Finger-to-nose normal. Heel-to-shin normal.    Gait  Casual gait is normal including stance, stride, and arm swing.       Laboratory/Radiology:     CBC with Differential:    Lab Results   Component Value Date    WBC 8.4 04/12/2020    RBC 5.31 04/12/2020    HGB 14.8 04/12/2020    HCT 47.8 04/12/2020     04/12/2020    MCV 90.0 04/12/2020    MCH 27.9 04/12/2020    MCHC 31.0 04/12/2020    RDW 13.1 04/12/2020    SEGSPCT 70 04/09/2013    LYMPHOPCT 28.2 01/30/2018    MONOPCT 4.9 01/30/2018    BASOPCT 0.6 01/30/2018    MONOSABS 0.48 01/30/2018    LYMPHSABS 2.74 01/30/2018    EOSABS 0.15 01/30/2018    BASOSABS 0.06 01/30/2018     CMP:    Lab Results   Component Value Date     04/12/2020    K 4.0 04/12/2020     04/12/2020    CO2 25 04/12/2020    BUN 12 04/12/2020    CREATININE 0.9 04/12/2020    GFRAA >60 04/12/2020    LABGLOM >60 04/12/2020    GLUCOSE 113 04/12/2020    PROT 7.9 04/11/2020    LABALBU 3.2 04/11/2020    CALCIUM 9.1 04/12/2020    BILITOT 0.4 04/11/2020    ALKPHOS 94 04/11/2020    AST 20 04/11/2020    ALT 19 04/11/2020     Troponin:    Lab
Polydipsia, polyuria:  No [x]/Yes []  Skin:              Rash:    No [x]/Yes []      Ulcers:   No [x]/Yes []              Abnorm pigment: No [x]/Yes []  :              Frequency/urgency:  No [x]/Yes []      Hematuria:    No [x]/Yes []                      Incontinence:    No [x]/Yes []    PHYSICAL EXAM:  Vitals:    04/12/20 0745   BP: 129/71   Pulse: 82   Resp: 18   Temp: 98 °F (36.7 °C)   SpO2: 96%     General Appearance: alert and oriented to person, place and time, in no acute distress, well developed and well- nourished  Neurologic: no cranial nerve deficit, speech normal  Head: normocephalic and atraumatic  Eyes: extraocular eye movements intact, conjunctivae normal  ENT: external ear and ear canal normal bilaterally, nose without deformity  Pulmonary/Chest: normal air movement, no respiratory distress  Cardiovascular: normal rate, regular rhythm  Abdomen: non-distended, no masses  Musculoskeletal: no joint deformity or tenderness  Extremities: no leg edema bilaterally, right hand is weak also forearm. Skin: warm and dry, no rash or erythema    PULSE EXAM      Right      Left   Brachial     Radial     Femoral     Popliteal     Dorsalis Pedis     Posterior Tibial     (3=normal, 2=diminished, 1=barely palpable, 4=widened)      LABS:    Lab Results   Component Value Date    WBC 8.4 04/12/2020    HGB 14.8 04/12/2020    HCT 47.8 04/12/2020     (H) 04/12/2020    PROTIME 11.6 04/11/2020    INR 1.0 04/11/2020    K 4.0 04/12/2020    BUN 12 04/12/2020    CREATININE 0.9 04/12/2020       RADIOLOGY:    CTA head and neck reviewed.

## 2020-04-13 ENCOUNTER — APPOINTMENT (OUTPATIENT)
Dept: MRI IMAGING | Age: 50
DRG: 024 | End: 2020-04-13
Payer: COMMERCIAL

## 2020-04-13 LAB
APTT: 32.3 SEC (ref 24.5–35.1)
APTT: 52.1 SEC (ref 24.5–35.1)
APTT: 52.8 SEC (ref 24.5–35.1)
LV EF: 55 %
LVEF MODALITY: NORMAL

## 2020-04-13 PROCEDURE — 97530 THERAPEUTIC ACTIVITIES: CPT

## 2020-04-13 PROCEDURE — 6360000002 HC RX W HCPCS: Performed by: PSYCHIATRY & NEUROLOGY

## 2020-04-13 PROCEDURE — 36415 COLL VENOUS BLD VENIPUNCTURE: CPT

## 2020-04-13 PROCEDURE — 92523 SPEECH SOUND LANG COMPREHEN: CPT

## 2020-04-13 PROCEDURE — 93306 TTE W/DOPPLER COMPLETE: CPT

## 2020-04-13 PROCEDURE — 70551 MRI BRAIN STEM W/O DYE: CPT

## 2020-04-13 PROCEDURE — 6370000000 HC RX 637 (ALT 250 FOR IP): Performed by: INTERNAL MEDICINE

## 2020-04-13 PROCEDURE — 85730 THROMBOPLASTIN TIME PARTIAL: CPT

## 2020-04-13 PROCEDURE — 6370000000 HC RX 637 (ALT 250 FOR IP): Performed by: NURSE PRACTITIONER

## 2020-04-13 PROCEDURE — 99232 SBSQ HOSP IP/OBS MODERATE 35: CPT | Performed by: NURSE PRACTITIONER

## 2020-04-13 PROCEDURE — 2060000000 HC ICU INTERMEDIATE R&B

## 2020-04-13 PROCEDURE — 2580000003 HC RX 258: Performed by: NURSE PRACTITIONER

## 2020-04-13 PROCEDURE — 97535 SELF CARE MNGMENT TRAINING: CPT

## 2020-04-13 RX ADMIN — ATORVASTATIN CALCIUM 80 MG: 40 TABLET, FILM COATED ORAL at 21:56

## 2020-04-13 RX ADMIN — TRAMADOL HYDROCHLORIDE 50 MG: 50 TABLET, FILM COATED ORAL at 23:28

## 2020-04-13 RX ADMIN — ASPIRIN 81 MG: 81 TABLET, COATED ORAL at 08:17

## 2020-04-13 RX ADMIN — TRAMADOL HYDROCHLORIDE 50 MG: 50 TABLET, FILM COATED ORAL at 16:28

## 2020-04-13 RX ADMIN — HEPARIN SODIUM 2000 UNITS: 1000 INJECTION INTRAVENOUS; SUBCUTANEOUS at 12:49

## 2020-04-13 RX ADMIN — Medication 10 ML: at 21:56

## 2020-04-13 RX ADMIN — HEPARIN SODIUM 13 UNITS/KG/HR: 10000 INJECTION, SOLUTION INTRAVENOUS at 12:51

## 2020-04-13 RX ADMIN — TRAMADOL HYDROCHLORIDE 50 MG: 50 TABLET, FILM COATED ORAL at 08:17

## 2020-04-13 ASSESSMENT — PAIN DESCRIPTION - FREQUENCY: FREQUENCY: INTERMITTENT

## 2020-04-13 ASSESSMENT — PAIN SCALES - GENERAL
PAINLEVEL_OUTOF10: 0
PAINLEVEL_OUTOF10: 7
PAINLEVEL_OUTOF10: 7
PAINLEVEL_OUTOF10: 0
PAINLEVEL_OUTOF10: 0
PAINLEVEL_OUTOF10: 7

## 2020-04-13 ASSESSMENT — PAIN - FUNCTIONAL ASSESSMENT: PAIN_FUNCTIONAL_ASSESSMENT: ACTIVITIES ARE NOT PREVENTED

## 2020-04-13 ASSESSMENT — PAIN DESCRIPTION - PAIN TYPE: TYPE: ACUTE PAIN

## 2020-04-13 ASSESSMENT — PAIN DESCRIPTION - ONSET: ONSET: ON-GOING

## 2020-04-13 ASSESSMENT — PAIN DESCRIPTION - PROGRESSION: CLINICAL_PROGRESSION: GRADUALLY WORSENING

## 2020-04-13 ASSESSMENT — PAIN DESCRIPTION - DESCRIPTORS: DESCRIPTORS: HEADACHE

## 2020-04-13 ASSESSMENT — PAIN DESCRIPTION - LOCATION: LOCATION: HEAD

## 2020-04-13 NOTE — PROGRESS NOTES
Physical Therapy  Physical Therapy Initial Assessment   Name: Winter Goodrich  : 1970  MRN: [id number]    Referring Provider:  MANUEL Emerson CNP    Date of Service: 2020    Evaluating PT:  Ryan Frazier, PT, DPT [id number]    Room #:  5429/9809-I  Diagnosis:  Acute CVA  PMHx/PSHx:  Pulmonary emphysema, HLD  Precautions:  RUE weakness  Equipment Needs:  None at this time    SUBJECTIVE:    Pt lives with grandmother in a 1 story home with 1 stairs to enter and 1 rail. Bed is on first floor and bath is on first floor. Pt ambulated with no AD independently PTA. 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 Pt notes her daughter assists with transportation and driving as needed. OBJECTIVE:   Initial Evaluation  Date: 2020 Treatment  20 Goals:   AM-PAC 6 Clicks 26/42     Was pt agreeable to Eval/treatment? yes yes    Does pt have pain? No c/o pain No c/o pain     Bed Mobility  Rolling: SBA  Supine to sit: SBA  Sit to supine: SBA  Scooting: SBA indep with all aspects Independent   Transfers Sit to stand: SBA  Stand to sit: SBA  Stand pivot: SBA Supervision w/ all aspects Independent   Ambulation    150 feet with no AD  ft x 1 no AD supervision 300 feet no AD Independent   Stair negotiation: ascended and descended  4 steps with 1 rail SBA 8 steps w/ 1 rail supervision d/t IV line management 12 steps 1 rail Independent   ROM BUE:  Per OT note  BLE:  WNL     Strength BUE:  Weakness to RUE. Worse distally than proximally. Please refer to OT note for further details. BLE:  5/5     Balance Sitting EOB:  SBA  Dynamic Standing:  SBA  Independent       Patient education  Pt educated on integrating use of R UE into her daily activity to facilitate regained use of R UE   Patient response to education:   Pt verbalized understanding Pt demonstrated skill Pt requires further education in this area   yes yes no     ASSESSMENT:    Comments / Treatment :    Pt cleared for therapy.

## 2020-04-13 NOTE — PROGRESS NOTES
Patient off floor currently for MRI. Tentatively scheduled for left carotid endarterectomy Wednesday if stable from neurology and primary service standpoint. I will review options with patient tomorrow.

## 2020-04-13 NOTE — PROGRESS NOTES
Occupational Therapy  OT BEDSIDE TREATMENT NOTE      Date:2020  Patient Name: Sravanthi Cedeño  MRN: [id number]  : 1970  Room: [facility]-A     Evaluating OT: OSCAR Wick, OTR/L [id number]     AM-PAC Daily Activity Raw Score:   Recommended Adaptive Equipment: OUTPATIENT OCCUPATIONAL THERAPY     Modified Schuyler Scale (MRS)  Score     Description  0             No symptoms  1             No significant disability despite symptoms  2             Slight disability; able to look after own affairs  3             Moderate disability; able to ambulate without assist/ requires assist with ADLs  4             Moderate/Severe disability;requires assist to ambulate/assist with ADLs  5             Severe disability;bedridden/incontinent   6               Score: 3     Diagnosis: CVA   Referring Practitioner: MANUEL Hermosillo CNP  Surgery: N/A   Pertinent Medical History: PE   Precautions:  Falls, R hemiparesis     Home Living: 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) in a 1 story home with 1 step(s) to enter and B rail(s); bed/bath/laundry on main level   Bathroom setup: tub/shower unit   Equipment owned: shower chair  Prior Level of Function: IND with ADLs/IADLs; using ww for functional mobility   Driving: no     Pain Level: 0/10  Cognition: A&O: 4/4; Follows multi step directions               Memory:  good               Sequencing:  good               Problem solving:  fair               Judgement/safety:  fair                 Functional Assessment:    Initial Eval Status  Date: 2020 Treatment Status  Date: 2020 Short Term Goals  Treatment frequency: 2-5x/wk   Feeding Min A  Set up  Using LUE  IND   Grooming Min A (standing)  Min A  Standing, cuing for RUE  IND   UB Dressing Min A (due to limited ROM/R hemiparesis) SBA  To don/doff gown while seated  IND   LB Dressing SUP (pt able to use crossing of leg technqiue to doff/gwen B socks) Supervision  To don/doff socks while seated

## 2020-04-13 NOTE — PROGRESS NOTES
personally reviewed the patient's MRI:     1.  Acute to early subacute cortical and subcortical infarction in   left frontal and parietal lobes. 2.  No evidence of hemorrhagic transformation, mass effect or midline   shift. I have personally reviewed the patient's telemetry which is normal sinus rhythm no arrhythmia    Assessment/Plan:  Principal Problem:    CVA (cerebral vascular accident) ([facility]  Active Problems:    Pulmonary emphysema ([facility]    Tobacco abuse    Acute CVA (cerebrovascular accident) ([facility]    Morbid obesity with BMI of 45.0-49.9, adult ([facility]    Hyperlipidemia    Symptomatic stenosis of left carotid artery  Resolved Problems:    * No resolved hospital problems. *     Continue aspirin, atorvastatin, and heparin drip for possible unstable plaque in the left carotid artery.   Appreciate neurology and vascular surgery consults  Possible CEA later this week  Nicotine patch and continued tobacco cessation counseling  Would benefit from bariatric surgery once recovered from the stroke    Requires continued inpatient level of care  Sammie Melton Every    2:51 PM  4/13/2020  Cell: [phone]

## 2020-04-14 LAB
ABO/RH: NORMAL
ANTIBODY SCREEN: NORMAL
APTT: 56.6 SEC (ref 24.5–35.1)
APTT: 60.6 SEC (ref 24.5–35.1)
APTT: 63.7 SEC (ref 24.5–35.1)
APTT: 82.7 SEC (ref 24.5–35.1)
PLATELET # BLD: 420 E9/L (ref 130–450)

## 2020-04-14 PROCEDURE — 99233 SBSQ HOSP IP/OBS HIGH 50: CPT | Performed by: SURGERY

## 2020-04-14 PROCEDURE — 85730 THROMBOPLASTIN TIME PARTIAL: CPT

## 2020-04-14 PROCEDURE — 6360000002 HC RX W HCPCS: Performed by: PSYCHIATRY & NEUROLOGY

## 2020-04-14 PROCEDURE — 6370000000 HC RX 637 (ALT 250 FOR IP): Performed by: INTERNAL MEDICINE

## 2020-04-14 PROCEDURE — 6360000002 HC RX W HCPCS: Performed by: SURGERY

## 2020-04-14 PROCEDURE — 2060000000 HC ICU INTERMEDIATE R&B

## 2020-04-14 PROCEDURE — 36415 COLL VENOUS BLD VENIPUNCTURE: CPT

## 2020-04-14 PROCEDURE — 6370000000 HC RX 637 (ALT 250 FOR IP): Performed by: NURSE PRACTITIONER

## 2020-04-14 PROCEDURE — 86850 RBC ANTIBODY SCREEN: CPT

## 2020-04-14 PROCEDURE — 86901 BLOOD TYPING SEROLOGIC RH(D): CPT

## 2020-04-14 PROCEDURE — 97530 THERAPEUTIC ACTIVITIES: CPT

## 2020-04-14 PROCEDURE — 85049 AUTOMATED PLATELET COUNT: CPT

## 2020-04-14 PROCEDURE — 99232 SBSQ HOSP IP/OBS MODERATE 35: CPT | Performed by: NURSE PRACTITIONER

## 2020-04-14 PROCEDURE — 86900 BLOOD TYPING SEROLOGIC ABO: CPT

## 2020-04-14 PROCEDURE — 2580000003 HC RX 258: Performed by: NURSE PRACTITIONER

## 2020-04-14 RX ORDER — CEFAZOLIN SODIUM 2 G/50ML
2 SOLUTION INTRAVENOUS
Status: COMPLETED | OUTPATIENT
Start: 2020-04-14 | End: 2020-04-15

## 2020-04-14 RX ADMIN — TRAMADOL HYDROCHLORIDE 50 MG: 50 TABLET, FILM COATED ORAL at 08:32

## 2020-04-14 RX ADMIN — TRAMADOL HYDROCHLORIDE 50 MG: 50 TABLET, FILM COATED ORAL at 23:02

## 2020-04-14 RX ADMIN — Medication 10 ML: at 08:32

## 2020-04-14 RX ADMIN — ATORVASTATIN CALCIUM 80 MG: 40 TABLET, FILM COATED ORAL at 20:08

## 2020-04-14 RX ADMIN — HEPARIN SODIUM 13 UNITS/KG/HR: 10000 INJECTION, SOLUTION INTRAVENOUS at 05:16

## 2020-04-14 RX ADMIN — TRAMADOL HYDROCHLORIDE 50 MG: 50 TABLET, FILM COATED ORAL at 14:30

## 2020-04-14 RX ADMIN — HEPARIN SODIUM 11 UNITS/KG/HR: 10000 INJECTION, SOLUTION INTRAVENOUS at 23:04

## 2020-04-14 RX ADMIN — ASPIRIN 81 MG: 81 TABLET, COATED ORAL at 08:32

## 2020-04-14 ASSESSMENT — PAIN DESCRIPTION - DESCRIPTORS
DESCRIPTORS: ACHING;DISCOMFORT;HEADACHE
DESCRIPTORS: ACHING
DESCRIPTORS: ACHING;DISCOMFORT

## 2020-04-14 ASSESSMENT — PAIN DESCRIPTION - FREQUENCY
FREQUENCY: INTERMITTENT
FREQUENCY: CONTINUOUS

## 2020-04-14 ASSESSMENT — PAIN DESCRIPTION - PROGRESSION: CLINICAL_PROGRESSION: GRADUALLY IMPROVING

## 2020-04-14 ASSESSMENT — PAIN DESCRIPTION - PAIN TYPE
TYPE: ACUTE PAIN

## 2020-04-14 ASSESSMENT — PAIN SCALES - GENERAL
PAINLEVEL_OUTOF10: 6
PAINLEVEL_OUTOF10: 7
PAINLEVEL_OUTOF10: 0
PAINLEVEL_OUTOF10: 5

## 2020-04-14 ASSESSMENT — PAIN DESCRIPTION - LOCATION
LOCATION: HEAD

## 2020-04-14 ASSESSMENT — PAIN DESCRIPTION - ONSET
ONSET: ON-GOING
ONSET: PROGRESSIVE

## 2020-04-14 ASSESSMENT — PAIN DESCRIPTION - ORIENTATION: ORIENTATION: OTHER (COMMENT)

## 2020-04-14 NOTE — PROGRESS NOTES
04/12/2020     CMP:    Lab Results   Component Value Date     04/12/2020    K 4.0 04/12/2020     04/12/2020    CO2 25 04/12/2020    BUN 12 04/12/2020    CREATININE 0.9 04/12/2020    GFRAA >60 04/12/2020    LABGLOM >60 04/12/2020    GLUCOSE 113 04/12/2020    PROT 7.9 04/11/2020    LABALBU 3.2 04/11/2020    CALCIUM 9.1 04/12/2020    BILITOT 0.4 04/11/2020    ALKPHOS 94 04/11/2020    AST 20 04/11/2020    ALT 19 04/11/2020     Hepatic Function Panel:    Lab Results   Component Value Date    ALKPHOS 94 04/11/2020    ALT 19 04/11/2020    AST 20 04/11/2020    PROT 7.9 04/11/2020    BILITOT 0.4 04/11/2020    LABALBU 3.2 04/11/2020   HgBA1c:    Lab Results   Component Value Date    LABA1C 5.5 04/12/2020     FLP:    Lab Results   Component Value Date    TRIG 192 04/12/2020    HDL 41 04/12/2020    LDLCALC 119 04/12/2020    LABVLDL 38 04/12/2020     MRI Brain WO Contrast   Final Result      1. Acute to early subacute cortical and subcortical infarction in   left frontal and parietal lobes. 2.  No evidence of hemorrhagic transformation, mass effect or midline   shift. CTA HEAD W CONTRAST   Final Result      Subacute cortical infarct with loss of gray-white matter   differentiation and hypoattenuation along the posterior left frontal   lobe is once again seen without appreciable interval change. Soft atherosclerotic plaque results in approximately 50% stenosis of   the proximal left internal carotid artery and 70% stenosis of the   proximal right internal carotid artery based on NASCET criteria. Small caliber cystic changes of the lung apices. CTA NECK W CONTRAST   Final Result      Subacute cortical infarct with loss of gray-white matter   differentiation and hypoattenuation along the posterior left frontal   lobe is once again seen without appreciable interval change.       Soft atherosclerotic plaque results in approximately 50% stenosis of   the proximal left internal carotid artery and 70% stenosis of the   proximal right internal carotid artery based on NASCET criteria. Small caliber cystic changes of the lung apices. CT Head WO Contrast   Final Result      Focal area of hypoattenuation is associated with the posterior left   frontal cortex which could indicate focal area of stroke. MRI may be   helpful for further evaluation. ALERT:  THIS IS AN ABNORMAL REPORT. Complete echo 4/13/2020:  Technically adequate study. No gross regional wall motion abnormality. Left atrium is of normal size. No evidence of thrombus within left atrium. No evidence of thrombus or mass in the right atrium. No right to left shunting by bubble study with Valsalva. Normal diastolic function. No obvious cardiovascular source of emboli identified. EF 55%    All labs and imaging reviewed independently today. Assessment:     Left MCA distribution embolic stroke with cortical pain syndrome. CTA concerning for left ICA source--soft plaque versus mural thrombus   CTA showed soft plaque approx 50% stenosis of the left ICA versus mural thrombus on left ICA shortly after bifurcation and 70% stenosis at the proximal right ICA. MRI brain showed acute to early subacute cortical and subcortical infarctions in left frontal and parietal lobes; no evidence of hemorrhagic transformation or midline shift. Complete echo showed no obvious cardiovascular source of emboli. Vascular with plans for L CEA on Wednesday due possible mural thrombus and symptomatic left ICA stenosis present. At present patient is alert and oriented x4, following commands, with right upper extremity weakness noted.      Plan:     Possible L CEA on Wednesday   Vascular surgery following  Continue heparin drip per protocol   Hold Plavix due to heparin drip  Continue aspirin and statin  , A1c 5.5  Continue PT/OT/ST  Continue telemetry  SCDs  Stroke education  Smoking cessation and weight loss encouraged    Geronimo Brandon

## 2020-04-14 NOTE — PROGRESS NOTES
Physical Therapy  Physical Therapy Initial Assessment   Name: Adeola Centeno  : 1970  MRN: [id number]    Referring Provider:  MANUEL Wells Mai, CNP    Date of Service: 2020    Evaluating PT:  Mikaela Nunez PT, DPT [id number]    Room #:  2985/8574-R  Diagnosis:  Acute CVA  PMHx/PSHx:  Pulmonary emphysema, HLD  Precautions:  RUE weakness  Equipment Needs:  None at this time    SUBJECTIVE:    Pt lives with grandmother in a 1 story home with 1 stairs to enter and 1 rail. Bed is on first floor and bath is on first floor. Pt ambulated with no AD independently PTA. 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 Pt notes her daughter assists with transportation and driving as needed. OBJECTIVE:   Initial Evaluation  Date: 2020 Treatment  20 Goals:   AM-PAC 6 Clicks  71/36    Was pt agreeable to Eval/treatment? yes yes    Does pt have pain? No c/o pain No c/o pain     Bed Mobility  Rolling: SBA  Supine to sit: SBA  Sit to supine: SBA  Scooting: SBA indep with all aspects Independent   Transfers Sit to stand: SBA  Stand to sit: SBA  Stand pivot: SBA indep w/ all aspects Independent   Ambulation    150 feet with no AD  ft x 1 no AD indep 300 feet no AD Independent   Stair negotiation: ascended and descended  4 steps with 1 rail SBA 12 steps w/ 1 rail supervision d/t IV line management 12 steps 1 rail Independent   ROM BUE:  Per OT note  BLE:  WNL     Strength BUE:  Weakness to RUE. Worse distally than proximally. Please refer to OT note for further details. BLE:  5/5     Balance Sitting EOB:  SBA  Dynamic Standing:  SBA  Independent       Patient education  Pt educated on integrating use of R UE into her daily activity to facilitate regained use of R UE   Patient response to education:   Pt verbalized understanding Pt demonstrated skill Pt requires further education in this area   yes yes no     ASSESSMENT:    Comments / Treatment :    Pt cleared for therapy.  Pt reports she has been attempting to use R UE and hand more throughout the day. She also reports she has been amb around the unit off and on , on her own.  nsg confirms. Pt performed functional mobility as noted above , no LOB noted. Gait is reciprocating w/ equal step/stride lengths and appropriate BRIAN. Pt remains supervision on steps d/t IV line management. Pt reported she is scheduled for carotid surgery 4/15/20 in am.  Pt returned to bed.      Time in  1300  Time out  1323    Total Treatment Time  23 minutes         CPT codes:  [] Low Complexity PT evaluation 66837  [] Moderate Complexity PT evaluation 97071  [] High Complexity PT evaluation 89725  [] PT Re-evaluation 12117  [] Gait training 36838 0 minutes  [] Manual therapy 14006 0 minutes  [x] Therapeutic activities 77315 23 minutes  [] Therapeutic exercises 37728 0 minutes  [] Neuromuscular reeducation 45648 0 minutes     Saida Mo PTA 3820

## 2020-04-15 ENCOUNTER — ANESTHESIA EVENT (OUTPATIENT)
Dept: OPERATING ROOM | Age: 50
DRG: 024 | End: 2020-04-15
Payer: COMMERCIAL

## 2020-04-15 ENCOUNTER — ANESTHESIA (OUTPATIENT)
Dept: OPERATING ROOM | Age: 50
DRG: 024 | End: 2020-04-15
Payer: COMMERCIAL

## 2020-04-15 VITALS — OXYGEN SATURATION: 96 % | SYSTOLIC BLOOD PRESSURE: 131 MMHG | TEMPERATURE: 96.8 F | DIASTOLIC BLOOD PRESSURE: 103 MMHG

## 2020-04-15 LAB — APTT: 31.7 SEC (ref 24.5–35.1)

## 2020-04-15 PROCEDURE — 6370000000 HC RX 637 (ALT 250 FOR IP): Performed by: STUDENT IN AN ORGANIZED HEALTH CARE EDUCATION/TRAINING PROGRAM

## 2020-04-15 PROCEDURE — 3700000000 HC ANESTHESIA ATTENDED CARE: Performed by: SURGERY

## 2020-04-15 PROCEDURE — 3700000001 HC ADD 15 MINUTES (ANESTHESIA): Performed by: SURGERY

## 2020-04-15 PROCEDURE — 4A133B1 MONITORING OF ARTERIAL PRESSURE, PERIPHERAL, PERCUTANEOUS APPROACH: ICD-10-PCS | Performed by: ANESTHESIOLOGY

## 2020-04-15 PROCEDURE — 2500000003 HC RX 250 WO HCPCS: Performed by: NURSE ANESTHETIST, CERTIFIED REGISTERED

## 2020-04-15 PROCEDURE — C9248 INJ, CLEVIDIPINE BUTYRATE: HCPCS | Performed by: NURSE ANESTHETIST, CERTIFIED REGISTERED

## 2020-04-15 PROCEDURE — 85730 THROMBOPLASTIN TIME PARTIAL: CPT

## 2020-04-15 PROCEDURE — 36415 COLL VENOUS BLD VENIPUNCTURE: CPT

## 2020-04-15 PROCEDURE — 4A133J1 MONITORING OF ARTERIAL PULSE, PERIPHERAL, PERCUTANEOUS APPROACH: ICD-10-PCS | Performed by: ANESTHESIOLOGY

## 2020-04-15 PROCEDURE — 88304 TISSUE EXAM BY PATHOLOGIST: CPT

## 2020-04-15 PROCEDURE — 3600000005 HC SURGERY LEVEL 5 BASE: Performed by: SURGERY

## 2020-04-15 PROCEDURE — 7100000001 HC PACU RECOVERY - ADDTL 15 MIN: Performed by: SURGERY

## 2020-04-15 PROCEDURE — 03CL0ZZ EXTIRPATION OF MATTER FROM LEFT INTERNAL CAROTID ARTERY, OPEN APPROACH: ICD-10-PCS | Performed by: SURGERY

## 2020-04-15 PROCEDURE — 03CJ0ZZ EXTIRPATION OF MATTER FROM LEFT COMMON CAROTID ARTERY, OPEN APPROACH: ICD-10-PCS | Performed by: SURGERY

## 2020-04-15 PROCEDURE — 6370000000 HC RX 637 (ALT 250 FOR IP)

## 2020-04-15 PROCEDURE — 7100000000 HC PACU RECOVERY - FIRST 15 MIN: Performed by: SURGERY

## 2020-04-15 PROCEDURE — 6360000002 HC RX W HCPCS: Performed by: SURGERY

## 2020-04-15 PROCEDURE — 2580000003 HC RX 258: Performed by: STUDENT IN AN ORGANIZED HEALTH CARE EDUCATION/TRAINING PROGRAM

## 2020-04-15 PROCEDURE — 6360000002 HC RX W HCPCS: Performed by: NURSE ANESTHETIST, CERTIFIED REGISTERED

## 2020-04-15 PROCEDURE — 2580000003 HC RX 258: Performed by: NURSE ANESTHETIST, CERTIFIED REGISTERED

## 2020-04-15 PROCEDURE — 2500000003 HC RX 250 WO HCPCS: Performed by: SURGERY

## 2020-04-15 PROCEDURE — 2000000000 HC ICU R&B

## 2020-04-15 PROCEDURE — 2709999900 HC NON-CHARGEABLE SUPPLY: Performed by: SURGERY

## 2020-04-15 PROCEDURE — 3600000015 HC SURGERY LEVEL 5 ADDTL 15MIN: Performed by: SURGERY

## 2020-04-15 PROCEDURE — 2580000003 HC RX 258: Performed by: SURGERY

## 2020-04-15 PROCEDURE — 85347 COAGULATION TIME ACTIVATED: CPT

## 2020-04-15 PROCEDURE — 35301 RECHANNELING OF ARTERY: CPT | Performed by: SURGERY

## 2020-04-15 RX ORDER — SODIUM CHLORIDE 9 MG/ML
INJECTION, SOLUTION INTRAVENOUS CONTINUOUS PRN
Status: DISCONTINUED | OUTPATIENT
Start: 2020-04-15 | End: 2020-04-15 | Stop reason: SDUPTHER

## 2020-04-15 RX ORDER — FENTANYL CITRATE 50 UG/ML
INJECTION, SOLUTION INTRAMUSCULAR; INTRAVENOUS PRN
Status: DISCONTINUED | OUTPATIENT
Start: 2020-04-15 | End: 2020-04-15 | Stop reason: SDUPTHER

## 2020-04-15 RX ORDER — NITROGLYCERIN 5 MG/ML
INJECTION, SOLUTION INTRAVENOUS PRN
Status: DISCONTINUED | OUTPATIENT
Start: 2020-04-15 | End: 2020-04-15 | Stop reason: SDUPTHER

## 2020-04-15 RX ORDER — PHENYLEPHRINE HYDROCHLORIDE 10 MG/ML
INJECTION INTRAVENOUS PRN
Status: DISCONTINUED | OUTPATIENT
Start: 2020-04-15 | End: 2020-04-15 | Stop reason: SDUPTHER

## 2020-04-15 RX ORDER — PROPOFOL 10 MG/ML
INJECTION, EMULSION INTRAVENOUS PRN
Status: DISCONTINUED | OUTPATIENT
Start: 2020-04-15 | End: 2020-04-15 | Stop reason: SDUPTHER

## 2020-04-15 RX ORDER — NEOSTIGMINE METHYLSULFATE 1 MG/ML
INJECTION, SOLUTION INTRAVENOUS PRN
Status: DISCONTINUED | OUTPATIENT
Start: 2020-04-15 | End: 2020-04-15 | Stop reason: SDUPTHER

## 2020-04-15 RX ORDER — HEPARIN SODIUM 1000 [USP'U]/ML
INJECTION, SOLUTION INTRAVENOUS; SUBCUTANEOUS PRN
Status: DISCONTINUED | OUTPATIENT
Start: 2020-04-15 | End: 2020-04-15 | Stop reason: SDUPTHER

## 2020-04-15 RX ORDER — LABETALOL HYDROCHLORIDE 5 MG/ML
10 INJECTION, SOLUTION INTRAVENOUS EVERY 30 MIN PRN
Status: DISCONTINUED | OUTPATIENT
Start: 2020-04-15 | End: 2020-04-16 | Stop reason: HOSPADM

## 2020-04-15 RX ORDER — SODIUM CHLORIDE, SODIUM LACTATE, POTASSIUM CHLORIDE, CALCIUM CHLORIDE 600; 310; 30; 20 MG/100ML; MG/100ML; MG/100ML; MG/100ML
INJECTION, SOLUTION INTRAVENOUS CONTINUOUS PRN
Status: DISCONTINUED | OUTPATIENT
Start: 2020-04-15 | End: 2020-04-15 | Stop reason: SDUPTHER

## 2020-04-15 RX ORDER — LIDOCAINE HYDROCHLORIDE 20 MG/ML
INJECTION, SOLUTION INTRAVENOUS PRN
Status: DISCONTINUED | OUTPATIENT
Start: 2020-04-15 | End: 2020-04-15 | Stop reason: SDUPTHER

## 2020-04-15 RX ORDER — BUPIVACAINE HYDROCHLORIDE 2.5 MG/ML
INJECTION, SOLUTION EPIDURAL; INFILTRATION; INTRACAUDAL PRN
Status: DISCONTINUED | OUTPATIENT
Start: 2020-04-15 | End: 2020-04-15 | Stop reason: ALTCHOICE

## 2020-04-15 RX ORDER — ONDANSETRON 2 MG/ML
INJECTION INTRAMUSCULAR; INTRAVENOUS PRN
Status: DISCONTINUED | OUTPATIENT
Start: 2020-04-15 | End: 2020-04-15 | Stop reason: SDUPTHER

## 2020-04-15 RX ORDER — GLYCOPYRROLATE 1 MG/5 ML
SYRINGE (ML) INTRAVENOUS PRN
Status: DISCONTINUED | OUTPATIENT
Start: 2020-04-15 | End: 2020-04-15 | Stop reason: SDUPTHER

## 2020-04-15 RX ORDER — HYDRALAZINE HYDROCHLORIDE 20 MG/ML
10 INJECTION INTRAMUSCULAR; INTRAVENOUS EVERY 30 MIN PRN
Status: DISCONTINUED | OUTPATIENT
Start: 2020-04-15 | End: 2020-04-16 | Stop reason: HOSPADM

## 2020-04-15 RX ORDER — ROCURONIUM BROMIDE 10 MG/ML
INJECTION, SOLUTION INTRAVENOUS PRN
Status: DISCONTINUED | OUTPATIENT
Start: 2020-04-15 | End: 2020-04-15 | Stop reason: SDUPTHER

## 2020-04-15 RX ORDER — DEXAMETHASONE SODIUM PHOSPHATE 10 MG/ML
INJECTION INTRAMUSCULAR; INTRAVENOUS PRN
Status: DISCONTINUED | OUTPATIENT
Start: 2020-04-15 | End: 2020-04-15 | Stop reason: SDUPTHER

## 2020-04-15 RX ORDER — OXYCODONE HYDROCHLORIDE AND ACETAMINOPHEN 5; 325 MG/1; MG/1
2 TABLET ORAL EVERY 4 HOURS PRN
Status: DISCONTINUED | OUTPATIENT
Start: 2020-04-15 | End: 2020-04-16 | Stop reason: HOSPADM

## 2020-04-15 RX ORDER — OXYCODONE HYDROCHLORIDE AND ACETAMINOPHEN 5; 325 MG/1; MG/1
1 TABLET ORAL EVERY 6 HOURS PRN
Qty: 28 TABLET | Refills: 0 | Status: SHIPPED | OUTPATIENT
Start: 2020-04-15 | End: 2020-04-22

## 2020-04-15 RX ORDER — MIDAZOLAM HYDROCHLORIDE 1 MG/ML
INJECTION INTRAMUSCULAR; INTRAVENOUS PRN
Status: DISCONTINUED | OUTPATIENT
Start: 2020-04-15 | End: 2020-04-15 | Stop reason: SDUPTHER

## 2020-04-15 RX ORDER — TRAMADOL HYDROCHLORIDE 50 MG/1
TABLET ORAL
Status: COMPLETED
Start: 2020-04-15 | End: 2020-04-15

## 2020-04-15 RX ORDER — PROTAMINE SULFATE 10 MG/ML
INJECTION, SOLUTION INTRAVENOUS PRN
Status: DISCONTINUED | OUTPATIENT
Start: 2020-04-15 | End: 2020-04-15 | Stop reason: SDUPTHER

## 2020-04-15 RX ORDER — SODIUM CHLORIDE, SODIUM LACTATE, POTASSIUM CHLORIDE, CALCIUM CHLORIDE 600; 310; 30; 20 MG/100ML; MG/100ML; MG/100ML; MG/100ML
INJECTION, SOLUTION INTRAVENOUS CONTINUOUS
Status: DISCONTINUED | OUTPATIENT
Start: 2020-04-15 | End: 2020-04-16

## 2020-04-15 RX ORDER — OXYCODONE HYDROCHLORIDE AND ACETAMINOPHEN 5; 325 MG/1; MG/1
1 TABLET ORAL EVERY 4 HOURS PRN
Status: DISCONTINUED | OUTPATIENT
Start: 2020-04-15 | End: 2020-04-16 | Stop reason: HOSPADM

## 2020-04-15 RX ADMIN — PHENYLEPHRINE HYDROCHLORIDE 20 MCG/MIN: 10 INJECTION INTRAVENOUS at 08:09

## 2020-04-15 RX ADMIN — MIDAZOLAM 2 MG: 1 INJECTION INTRAMUSCULAR; INTRAVENOUS at 07:18

## 2020-04-15 RX ADMIN — FENTANYL CITRATE 50 MCG: 50 INJECTION, SOLUTION INTRAMUSCULAR; INTRAVENOUS at 08:26

## 2020-04-15 RX ADMIN — OXYCODONE AND ACETAMINOPHEN 2 TABLET: 5; 325 TABLET ORAL at 19:41

## 2020-04-15 RX ADMIN — HEPARIN SODIUM 8000 UNITS: 1000 INJECTION INTRAVENOUS; SUBCUTANEOUS at 08:08

## 2020-04-15 RX ADMIN — FENTANYL CITRATE 50 MCG: 50 INJECTION, SOLUTION INTRAMUSCULAR; INTRAVENOUS at 07:51

## 2020-04-15 RX ADMIN — TRAMADOL HYDROCHLORIDE 50 MG: 50 TABLET, FILM COATED ORAL at 11:36

## 2020-04-15 RX ADMIN — SODIUM CHLORIDE, POTASSIUM CHLORIDE, SODIUM LACTATE AND CALCIUM CHLORIDE: 600; 310; 30; 20 INJECTION, SOLUTION INTRAVENOUS at 09:00

## 2020-04-15 RX ADMIN — PHENYLEPHRINE HYDROCHLORIDE 50 MCG: 10 INJECTION INTRAVENOUS at 07:43

## 2020-04-15 RX ADMIN — HEPARIN SODIUM 3000 UNITS: 1000 INJECTION INTRAVENOUS; SUBCUTANEOUS at 08:17

## 2020-04-15 RX ADMIN — ROCURONIUM BROMIDE 50 MG: 10 INJECTION, SOLUTION INTRAVENOUS at 07:20

## 2020-04-15 RX ADMIN — PROPOFOL 150 MG: 10 INJECTION, EMULSION INTRAVENOUS at 07:20

## 2020-04-15 RX ADMIN — CLEVIPIDINE 5 MG/HR: 0.5 EMULSION INTRAVENOUS at 09:24

## 2020-04-15 RX ADMIN — SODIUM CHLORIDE, POTASSIUM CHLORIDE, SODIUM LACTATE AND CALCIUM CHLORIDE 100 ML/HR: 600; 310; 30; 20 INJECTION, SOLUTION INTRAVENOUS at 11:27

## 2020-04-15 RX ADMIN — SODIUM CHLORIDE, POTASSIUM CHLORIDE, SODIUM LACTATE AND CALCIUM CHLORIDE: 600; 310; 30; 20 INJECTION, SOLUTION INTRAVENOUS at 20:08

## 2020-04-15 RX ADMIN — ATORVASTATIN CALCIUM 80 MG: 40 TABLET, FILM COATED ORAL at 21:21

## 2020-04-15 RX ADMIN — PROPOFOL 20 MG: 10 INJECTION, EMULSION INTRAVENOUS at 07:51

## 2020-04-15 RX ADMIN — NITROGLYCERIN 50 MCG: 5 INJECTION, SOLUTION INTRAVENOUS at 09:18

## 2020-04-15 RX ADMIN — PROTAMINE SULFATE 30 MG: 10 INJECTION, SOLUTION INTRAVENOUS at 08:58

## 2020-04-15 RX ADMIN — LIDOCAINE HYDROCHLORIDE 100 MG: 20 INJECTION, SOLUTION INTRAVENOUS at 07:20

## 2020-04-15 RX ADMIN — Medication 3 MG: at 09:17

## 2020-04-15 RX ADMIN — CEFAZOLIN SODIUM 2 G: 2 SOLUTION INTRAVENOUS at 07:47

## 2020-04-15 RX ADMIN — ONDANSETRON HYDROCHLORIDE 4 MG: 2 INJECTION, SOLUTION INTRAMUSCULAR; INTRAVENOUS at 09:10

## 2020-04-15 RX ADMIN — MIDAZOLAM 2 MG: 1 INJECTION INTRAMUSCULAR; INTRAVENOUS at 07:15

## 2020-04-15 RX ADMIN — DEXAMETHASONE SODIUM PHOSPHATE 10 MG: 10 INJECTION INTRAMUSCULAR; INTRAVENOUS at 07:20

## 2020-04-15 RX ADMIN — SODIUM CHLORIDE, POTASSIUM CHLORIDE, SODIUM LACTATE AND CALCIUM CHLORIDE: 600; 310; 30; 20 INJECTION, SOLUTION INTRAVENOUS at 07:23

## 2020-04-15 RX ADMIN — NITROGLYCERIN 100 MCG: 5 INJECTION, SOLUTION INTRAVENOUS at 09:22

## 2020-04-15 RX ADMIN — Medication 0.6 MG: at 09:17

## 2020-04-15 RX ADMIN — SODIUM CHLORIDE: 9 INJECTION, SOLUTION INTRAVENOUS at 07:15

## 2020-04-15 RX ADMIN — NITROGLYCERIN 100 MCG: 5 INJECTION, SOLUTION INTRAVENOUS at 09:20

## 2020-04-15 RX ADMIN — FENTANYL CITRATE 150 MCG: 50 INJECTION, SOLUTION INTRAMUSCULAR; INTRAVENOUS at 07:20

## 2020-04-15 ASSESSMENT — PULMONARY FUNCTION TESTS
PIF_VALUE: 24
PIF_VALUE: 25
PIF_VALUE: 21
PIF_VALUE: 25
PIF_VALUE: 24
PIF_VALUE: 24
PIF_VALUE: 25
PIF_VALUE: 24
PIF_VALUE: 22
PIF_VALUE: 24
PIF_VALUE: 22
PIF_VALUE: 25
PIF_VALUE: 24
PIF_VALUE: 25
PIF_VALUE: 3
PIF_VALUE: 25
PIF_VALUE: 3
PIF_VALUE: 23
PIF_VALUE: 24
PIF_VALUE: 0
PIF_VALUE: 23
PIF_VALUE: 2
PIF_VALUE: 24
PIF_VALUE: 25
PIF_VALUE: 24
PIF_VALUE: 24
PIF_VALUE: 25
PIF_VALUE: 24
PIF_VALUE: 25
PIF_VALUE: 24
PIF_VALUE: 23
PIF_VALUE: 21
PIF_VALUE: 24
PIF_VALUE: 25
PIF_VALUE: 24
PIF_VALUE: 20
PIF_VALUE: 22
PIF_VALUE: 18
PIF_VALUE: 0
PIF_VALUE: 25
PIF_VALUE: 24
PIF_VALUE: 24
PIF_VALUE: 25
PIF_VALUE: 1
PIF_VALUE: 24
PIF_VALUE: 0
PIF_VALUE: 24
PIF_VALUE: 0
PIF_VALUE: 23
PIF_VALUE: 26
PIF_VALUE: 24
PIF_VALUE: 24
PIF_VALUE: 25
PIF_VALUE: 25
PIF_VALUE: 24
PIF_VALUE: 24
PIF_VALUE: 25
PIF_VALUE: 24
PIF_VALUE: 0
PIF_VALUE: 25
PIF_VALUE: 24
PIF_VALUE: 0
PIF_VALUE: 21
PIF_VALUE: 1
PIF_VALUE: 25
PIF_VALUE: 24
PIF_VALUE: 0
PIF_VALUE: 24
PIF_VALUE: 19
PIF_VALUE: 25
PIF_VALUE: 0
PIF_VALUE: 24
PIF_VALUE: 25
PIF_VALUE: 25
PIF_VALUE: 24
PIF_VALUE: 22
PIF_VALUE: 0
PIF_VALUE: 23
PIF_VALUE: 1
PIF_VALUE: 23
PIF_VALUE: 24
PIF_VALUE: 21
PIF_VALUE: 23
PIF_VALUE: 24
PIF_VALUE: 22
PIF_VALUE: 24
PIF_VALUE: 25
PIF_VALUE: 0
PIF_VALUE: 24
PIF_VALUE: 25
PIF_VALUE: 24
PIF_VALUE: 0
PIF_VALUE: 26
PIF_VALUE: 24
PIF_VALUE: 21
PIF_VALUE: 24
PIF_VALUE: 9
PIF_VALUE: 0
PIF_VALUE: 25
PIF_VALUE: 24
PIF_VALUE: 25
PIF_VALUE: 2
PIF_VALUE: 24
PIF_VALUE: 1
PIF_VALUE: 27
PIF_VALUE: 24
PIF_VALUE: 24
PIF_VALUE: 23
PIF_VALUE: 25
PIF_VALUE: 22
PIF_VALUE: 24
PIF_VALUE: 24

## 2020-04-15 ASSESSMENT — PAIN DESCRIPTION - DESCRIPTORS
DESCRIPTORS: DISCOMFORT;THROBBING
DESCRIPTORS: ACHING;HEADACHE
DESCRIPTORS: THROBBING

## 2020-04-15 ASSESSMENT — PAIN DESCRIPTION - PAIN TYPE
TYPE: ACUTE PAIN
TYPE: SURGICAL PAIN
TYPE: ACUTE PAIN

## 2020-04-15 ASSESSMENT — PAIN DESCRIPTION - ORIENTATION
ORIENTATION: LEFT

## 2020-04-15 ASSESSMENT — PAIN SCALES - GENERAL
PAINLEVEL_OUTOF10: 8
PAINLEVEL_OUTOF10: 0
PAINLEVEL_OUTOF10: 0
PAINLEVEL_OUTOF10: 4
PAINLEVEL_OUTOF10: 8
PAINLEVEL_OUTOF10: 0

## 2020-04-15 ASSESSMENT — PAIN DESCRIPTION - ONSET: ONSET: ON-GOING

## 2020-04-15 ASSESSMENT — LIFESTYLE VARIABLES: SMOKING_STATUS: 1

## 2020-04-15 ASSESSMENT — PAIN DESCRIPTION - FREQUENCY
FREQUENCY: CONTINUOUS

## 2020-04-15 ASSESSMENT — PAIN DESCRIPTION - LOCATION
LOCATION: NECK
LOCATION: HEAD
LOCATION: HEAD

## 2020-04-15 NOTE — PROGRESS NOTES
Chief Complaint:  Chief Complaint   Patient presents with    Extremity Weakness     right arm and leg numbness since 0800 yesterday 4/10     Subjective:    I saw her in the PACU. She was feeling good. Her strength in the right hand is stable compared to yesterday. Objective:    /64   Pulse 74   Temp 97.8 °F (36.6 °C)   Resp 13   Ht 5' 3\" (1.6 m)   Wt 255 lb (115.7 kg)   LMP 05/19/2012   SpO2 96%   BMI 45.17 kg/m²     Current medications that patient is taking have been reviewed. General appearance: NAD, conversant  HEENT: AT/NC, MMM  Neck: FROM, supple, incision c/d/i  Lungs: Clear to auscultation  CV: RRR, no MRGs  Abdomen: Soft, non-tender; no masses or HSM, +BS  Extremities: No peripheral edema or digital cyanosis  Skin: no rash, lesions or ulcers  Psych: Calm and cooperative  Neuro: Alert and interactive, face symmetric, pupils round and reactive to light bilaterally. Diminished strength throughout right upper extremity with preserved sensation to light touch. Strength and sensation in other extremities are preserved. Labs:  CBC:   Lab Results   Component Value Date    WBC 8.7 04/12/2020    RBC 5.16 04/12/2020    HGB 14.9 04/12/2020    HCT 47.4 04/12/2020    MCV 91.9 04/12/2020    MCH 28.9 04/12/2020    MCHC 31.4 04/12/2020    RDW 13.1 04/12/2020     04/14/2020    MPV 10.1 04/12/2020     BMP:    Lab Results   Component Value Date     04/12/2020    K 4.0 04/12/2020     04/12/2020    CO2 25 04/12/2020    BUN 12 04/12/2020    LABALBU 3.2 04/11/2020    CREATININE 0.9 04/12/2020    CALCIUM 9.1 04/12/2020    GFRAA >60 04/12/2020    LABGLOM >60 04/12/2020    GLUCOSE 113 04/12/2020        Imaging:  I've personally reviewed the patient's MRI:     1.  Acute to early subacute cortical and subcortical infarction in   left frontal and parietal lobes. 2.  No evidence of hemorrhagic transformation, mass effect or midline   shift.      I have personally reviewed the patient's

## 2020-04-15 NOTE — PLAN OF CARE
Attempted to see patient however she is off the unit in surgery/postop. Will reattempt to see her later today if time permits, otherwise will see patient tomorrow.

## 2020-04-15 NOTE — OP NOTE
Son Cranker  1970      DATE OF PROCEDURE: 4/15/2020     SURGEON: Dara Lyman M.D.     ASSISTANT: Doug Hinton MD     PREOPERATIVE DIAGNOSIS: Symptomatic stenosis of the left internal carotid artery with infarction. POSTOPERATIVE DIAGNOSIS: Same    OPERATION: Left carotid endarterectomy. ANESTHESIA: GET     ESTIMATED BLOOD LOSS: less than 50 ml     COMPLICATIONS: None    SPECIMENS REMOVED: Carotid plaque     DESCRIPTION OF PROCEDURE: The patient was identified and the procedure was confirmed. The left neck was prepped and draped in the usual sterile fashion. A skin incision was made along the anterior border of the sternocleidomastoid muscle and carried down through the subcutaneous tissue. Dissection continued through the platysma and along the anterior border of the sternocleidomastoid muscle. The common facial vein was divided between silk ties. The common carotid artery was identified and dissected free from the surrounding tissues. It was surrounded proximally in the soft portion with an umbilical tape. The vagus nerve was deep to the artery and preserved. Dissection continued along the carotid artery and the external carotid artery and its superior thyroid branch were dissected free from the surrounding tissues and surrounded with vessel loops for control. The patient was then heparinized, maintaining an activated clotting time greater than 300 seconds. Dissection continued along the internal carotid artery, which was freed from the surrounding tissues and surrounded with a vessel loop for control. The vessel loops on the external carotid artery branches were controlled and the internal carotid artery was clamped. The common carotid artery was clamped and then the internal carotid artery was transected at its origin. There was a 70% stenosis in the origin of the internal carotid artery.  The arteriotomy was continued medially along the internal carotid artery and laterally along the bulb and common carotid artery. There was good back-bleeding from the internal artery so no shunt was placed. An eversion endarterectomy was performed of the internal carotid artery obtaining a smooth end point. A standard endarterectomy was then performed of the common carotid artery obtaining smooth end points. Loose fibrinous debris was removed from the vessel bed, which was flushed with heparinized saline solution. A 7-0 prolene stitch was placed in along the lateral internal carotid plaque edge for tacking. The internal carotid artery was then reimplanted onto the common carotid artery using a running 6-0 Prolene suture. Prior to completing the closure, the shunt was clamped, cut, and removed and the vessels were flushed and back-bled and the closure was completed. Flow was restored initially through the external carotid artery followed by the internal carotid artery. Flow through the vessels was confirmed with the handheld Doppler probe. The patient was given protamine and hemostasis was obtained. The incision was irrigated with antibiotic saline solution. The platysma was approximated with interrupted 3-0 Vicryl sutures and 0.25% Marcaine was injected into the subcutaneous tissue surrounding the incision. The skin was closed with dermal vicryl suture and a subcuticular monocryl stitch and skin adhesive was applied to the skin incision in the operating room. Needle, sponge, and instrument counts were reported as correct x2. The patient tolerated the procedure and was transferred to the Cardiovascular ICU in satisfactory condition. Dr. Nikki Boudreaux was present for the entire procedure.     Electronically signed by Lesa Putnam MD on 4/15/20 at 9:47 AM EDT

## 2020-04-16 VITALS
TEMPERATURE: 98.6 F | HEART RATE: 80 BPM | OXYGEN SATURATION: 93 % | BODY MASS INDEX: 45.18 KG/M2 | HEIGHT: 63 IN | RESPIRATION RATE: 23 BRPM | DIASTOLIC BLOOD PRESSURE: 88 MMHG | SYSTOLIC BLOOD PRESSURE: 149 MMHG | WEIGHT: 255 LBS

## 2020-04-16 LAB
APTT: 26.4 SEC (ref 24.5–35.1)
PLATELET # BLD: 412 E9/L (ref 130–450)
POC ACT LR: 149 SECONDS
POC ACT LR: 152 SECONDS
POC ACT LR: 233 SECONDS
POC ACT LR: 252 SECONDS

## 2020-04-16 PROCEDURE — 6370000000 HC RX 637 (ALT 250 FOR IP): Performed by: STUDENT IN AN ORGANIZED HEALTH CARE EDUCATION/TRAINING PROGRAM

## 2020-04-16 PROCEDURE — 2580000003 HC RX 258: Performed by: STUDENT IN AN ORGANIZED HEALTH CARE EDUCATION/TRAINING PROGRAM

## 2020-04-16 PROCEDURE — 85049 AUTOMATED PLATELET COUNT: CPT

## 2020-04-16 PROCEDURE — 6370000000 HC RX 637 (ALT 250 FOR IP): Performed by: SURGERY

## 2020-04-16 PROCEDURE — 85730 THROMBOPLASTIN TIME PARTIAL: CPT

## 2020-04-16 RX ORDER — PANTOPRAZOLE SODIUM 40 MG/1
40 TABLET, DELAYED RELEASE ORAL DAILY PRN
Status: DISCONTINUED | OUTPATIENT
Start: 2020-04-16 | End: 2020-04-16 | Stop reason: HOSPADM

## 2020-04-16 RX ORDER — ASPIRIN 81 MG/1
81 TABLET ORAL DAILY
Qty: 30 TABLET | Refills: 3 | Status: SHIPPED | OUTPATIENT
Start: 2020-04-17

## 2020-04-16 RX ORDER — PANTOPRAZOLE SODIUM 40 MG/1
40 TABLET, DELAYED RELEASE ORAL
Status: DISCONTINUED | OUTPATIENT
Start: 2020-04-16 | End: 2020-04-16 | Stop reason: HOSPADM

## 2020-04-16 RX ORDER — ATORVASTATIN CALCIUM 40 MG/1
40 TABLET, FILM COATED ORAL NIGHTLY
Qty: 30 TABLET | Refills: 3 | Status: SHIPPED | OUTPATIENT
Start: 2020-04-16

## 2020-04-16 RX ADMIN — PANTOPRAZOLE SODIUM 40 MG: 40 TABLET, DELAYED RELEASE ORAL at 01:27

## 2020-04-16 RX ADMIN — Medication 10 ML: at 08:04

## 2020-04-16 RX ADMIN — ASPIRIN 81 MG: 81 TABLET, COATED ORAL at 08:04

## 2020-04-16 RX ADMIN — OXYCODONE AND ACETAMINOPHEN 2 TABLET: 5; 325 TABLET ORAL at 08:04

## 2020-04-16 ASSESSMENT — PAIN DESCRIPTION - PAIN TYPE: TYPE: ACUTE PAIN

## 2020-04-16 ASSESSMENT — PAIN DESCRIPTION - ONSET: ONSET: GRADUAL

## 2020-04-16 ASSESSMENT — PAIN DESCRIPTION - PROGRESSION: CLINICAL_PROGRESSION: GRADUALLY WORSENING

## 2020-04-16 ASSESSMENT — PAIN SCALES - GENERAL
PAINLEVEL_OUTOF10: 0
PAINLEVEL_OUTOF10: 8
PAINLEVEL_OUTOF10: 0

## 2020-04-16 ASSESSMENT — PAIN DESCRIPTION - DESCRIPTORS: DESCRIPTORS: DISCOMFORT

## 2020-04-16 ASSESSMENT — PAIN DESCRIPTION - LOCATION: LOCATION: HEAD

## 2020-04-16 ASSESSMENT — PAIN - FUNCTIONAL ASSESSMENT: PAIN_FUNCTIONAL_ASSESSMENT: ACTIVITIES ARE NOT PREVENTED

## 2020-04-16 ASSESSMENT — PAIN DESCRIPTION - FREQUENCY: FREQUENCY: INTERMITTENT

## 2020-04-16 NOTE — PROGRESS NOTES
Vascular Surgery Progress Note    Pt is being seen in f/u today regarding POD1 Left carotid endarterectomy     Subjective  Pt s/e. Feels well, no issues overnight. Had a little to eat without swallowing difficulty. Current Medications:      pantoprazole, labetalol, hydrALAZINE, oxyCODONE-acetaminophen **OR** oxyCODONE-acetaminophen, albuterol, sodium chloride flush, magnesium hydroxide, promethazine **OR** ondansetron, perflutren lipid microspheres, acetaminophen    pantoprazole  40 mg Oral QAM AC    sodium chloride flush  10 mL Intravenous 2 times per day    aspirin  81 mg Oral Daily    atorvastatin  80 mg Oral Nightly        PHYSICAL EXAM:    BP (!) 151/76   Pulse 61   Temp 98.5 °F (36.9 °C) (Temporal)   Resp 18   Ht 5' 3\" (1.6 m)   Wt 255 lb (115.7 kg)   LMP 05/19/2012   SpO2 93%   BMI 45.17 kg/m²     Intake/Output Summary (Last 24 hours) at 4/16/2020 0636  Last data filed at 4/16/2020 0545  Gross per 24 hour   Intake 3756 ml   Output 2760 ml   Net 996 ml          Gen: awake, alert and oriented x3, no apparent distress  Neck: R neck incision with some bruising/small hematoma superiorly, minimal tenderness, CDI  CVS: RRR  Resp: No increased work of breathing  Abd: Soft, non-tender, non-distended  Neuro: CN 2-12 intact, R hand 3/5 hand grasp, all other extremities strength/sensation equal     LABS:    Lab Results   Component Value Date    WBC 8.7 04/12/2020    HGB 14.9 04/12/2020    HCT 47.4 04/12/2020     04/16/2020    PROTIME 11.6 04/11/2020    INR 1.0 04/11/2020    APTT 26.4 04/16/2020    K 4.0 04/12/2020    BUN 12 04/12/2020    CREATININE 0.9 04/12/2020       A/P  45 y.o. female POD1 right carotid endarterectomy    Ok for general diet  PO pain meds if needed  [place] [facility] for post carotid endarterectomy protocol  Ok for discharge from vascular POV    Electronically signed by Jocelin Espinoza DO on 4/16/2020 at 6:38 AM    Agree. Instructions reviewed. F/u 2 weeks. Daily asa.

## 2020-04-16 NOTE — FLOWSHEET NOTE
Discharge instructions given to pt. Paper script for Percocet given to pt. Pt verbalized understanding.

## 2020-04-16 NOTE — DISCHARGE SUMMARY
50% stenosis of   the proximal left internal carotid artery and 70% stenosis of the   proximal right internal carotid artery based on NASCET criteria.       Small caliber cystic changes of the lung apices. Discharge Exam:  Vitals:    04/16/20 0700 04/16/20 0800 04/16/20 0811 04/16/20 0900   BP: (!) 161/84 (!) 147/83     Pulse: 68 63 63 62   Resp: 22 19 20 16   Temp:  98.6 °F (37 °C)     TempSrc:  Temporal     SpO2: 92% 93% 92% 91%   Weight:       Height:          General appearance: NAD, conversant  HEENT: AT/NC, MMM  Neck: FROM, supple, incision c/d/i  Lungs: Clear to auscultation  CV: RRR, no MRGs  Abdomen: Soft, non-tender; no masses or HSM, +BS  Extremities: No peripheral edema or digital cyanosis  Skin: no rash, lesions or ulcers  Psych: Calm and cooperative  Neuro: Alert and interactive, face symmetric, diminished strength throughout right upper extremity with preserved sensation to light touch. Condition:  Stable    Disposition: home    Patient Instructions:   Current Discharge Medication List      START taking these medications    Details   aspirin 81 MG EC tablet Take 1 tablet by mouth daily  Qty: 30 tablet, Refills: 3      atorvastatin (LIPITOR) 40 MG tablet Take 1 tablet by mouth nightly  Qty: 30 tablet, Refills: 3      oxyCODONE-acetaminophen (PERCOCET) 5-325 MG per tablet Take 1 tablet by mouth every 6 hours as needed for Pain for up to 7 days. Intended supply: 7 days. Take lowest dose possible to manage pain  Qty: 28 tablet, Refills: 0    Comments: Reduce doses taken as pain becomes manageable  Associated Diagnoses: S/P carotid endarterectomy         STOP taking these medications       albuterol sulfate HFA (PROVENTIL HFA) 108 (90 Base) MCG/ACT inhaler Comments:   Reason for Stopping:             Activity: activity as tolerated  Diet: regular diet    Follow-up with PCP in 1 week.     Note that over 30 minutes was spent in preparing discharge papers, discussing discharge with patient,

## 2020-04-16 NOTE — FLOWSHEET NOTE
Pt ambulated from room to restroom without difficulty or complaints of headache or dizziness. Pt tolerated ambulation well.

## 2020-04-16 NOTE — PLAN OF CARE
Problem: Falls - Risk of:  Goal: Will remain free from falls  Description: Will remain free from falls  Outcome: Met This Shift     Problem: Pain:  Goal: Control of acute pain  Description: Control of acute pain  Outcome: Met This Shift

## 2020-04-17 ENCOUNTER — CARE COORDINATION (OUTPATIENT)
Dept: CASE MANAGEMENT | Age: 50
End: 2020-04-17

## 2020-04-17 NOTE — CARE COORDINATION
[facility] Transitions Initial Follow Up Call    Call within 2 business days of discharge: Yes    Patient: Jayjay Otero Patient : 1970   MRN: [id number]  Reason for Admission: CVA  Discharge Date: 20 RARS: Readmission Risk Score: 10      Last Discharge [facility]       Complaint Diagnosis Description Type Department Provider    20 Extremity Weakness S/P carotid endarterectomy . .. ED to Hosp-Admission (Discharged) (ADMITTED) [address] [facility] Camryn Worthington MD; Neel You,...    20 Numbness Cerebrovascular accident (CVA), unspecified mechanism [facility]) ED (DISCHARGE) [facility] TO ED Mehrdad Chang DO           Spoke with: Jayjay Otero, patient    Facility: [facility]    Non-face-to-face services provided:  Communication with home health agencies or other community services the patient is currently using-Per [facility], start of care scheduled for 20, but patient did not answer phone. Per Marielos, patient on schedule for soc on 20. Patient contacted regarding recent discharge and COVID-19 risk   Care Transition Nurse/ Ambulatory Care Manager contacted the patient by telephone to perform post discharge assessment. Verified name and  with patient as identifiers. Patient has following risk factors of: bronchitis. CTN/ACM reviewed discharge instructions, medical action plan and red flags related to discharge diagnosis. Reviewed and educated them on any new and changed medications related to discharge diagnosis. Advised obtaining a 90-day supply of all daily and as-needed medications. Education provided regarding infection prevention, and signs and symptoms of COVID-19 and when to seek medical attention with patient who verbalized understanding. Discussed exposure protocols and quarantine from [address] you at higher risk for severe illness  and given an opportunity for questions and concerns.  The patient agrees to contact the COVID-19 hotline [phone] or PCP office for

## 2020-04-20 ENCOUNTER — TELEPHONE (OUTPATIENT)
Dept: FAMILY MEDICINE CLINIC | Age: 50
End: 2020-04-20

## 2020-04-21 NOTE — TELEPHONE ENCOUNTER
Called pt to schedule hospital follow up--VV appt. Pt says her dgt is not there and she'll need to talk to her before setting anything up. Advised pt to call back when dgt gets there.

## 2020-04-22 ENCOUNTER — TELEPHONE (OUTPATIENT)
Dept: VASCULAR SURGERY | Age: 50
End: 2020-04-22

## 2020-04-24 ENCOUNTER — CARE COORDINATION (OUTPATIENT)
Dept: CASE MANAGEMENT | Age: 50
End: 2020-04-24

## 2020-04-24 NOTE — CARE COORDINATION
[facility] Transitions Follow Up Call    2020    Patient: Guille Altamirano  Patient : 1970   MRN: [id number]  Reason for Admission: CVA  Discharge Date: 20 RARS: Readmission Risk Score: 10         Spoke with: Guille Altamirano, patient    Covid 23 Monitoring Episode Resolved on 20    Patient/family has been provided the following resources and education related to COVID-19:                         Signs, symptoms and red flags related to COVID-19            CDC exposure and quarantine guidelines            [employer] exposure contact - [phone]            Contact for their local Department of Health                Patient denies any s/s of covid-19. No further outreach scheduled with this CTN/ACM. Episode of Care resolved. Patient has this CTN/ACM contact information if future needs arise.       Follow Up  Future Appointments   Date Time Provider Adrianna Moreland   2020 10:00 AM Kenny Smart MD [facility]       Norris Cristina RN

## 2020-04-28 ENCOUNTER — OFFICE VISIT (OUTPATIENT)
Dept: VASCULAR SURGERY | Age: 50
End: 2020-04-28

## 2020-04-28 PROCEDURE — 99024 POSTOP FOLLOW-UP VISIT: CPT | Performed by: PHYSICIAN ASSISTANT

## 2020-04-28 NOTE — PROGRESS NOTES
4/28/2020    Litzy Cortes  1970    Chief Complaint   Patient presents with    Post-Op Check     s/p (L) CEA     Patient returns for post operative evaluation status post left carotid endarterectomy. The patient denies any unexpected problems since hospital discharge. Notes she was having some difficulty with swallowing intermittently but this is markedly improving. Pain also improving and is managed with tylenol. She still has some R hand  weakness she is working with OT for but notes it is improving. She has complete resolution of her other RUE and RLE weakness. Procedure Laterality Date    CAROTID ENDARTERECTOMY Left 4/15/2020    CAROTID ENDARTERECTOMY performed by Amada Spence [facility] at [facility]      TUBAL LIGATION         Physical Exam:  The neck incision is healing without evidence of infection. Heart rhythm is regular. Radial pulse are appreciated bilaterally. Assessment:  Post-operative carotid endarterectomy. Problem List Items Addressed This Visit        Vascular Problems    Symptomatic stenosis of left carotid artery - Primary    Relevant Orders    VL DUP CAROTID BILATERAL      Other Visit Diagnoses     Carotid stenosis, asymptomatic, bilateral            I reviewed with the patient that normal activities can be resumed as tolerated. 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 The concern would be her R hand  strength and fall risk for both of them but she notes she does not think this wound be an issue. I told her she could continue care for her grandmother with caution of her limitations. Plan: Return in 6 months for follow-up carotid ultrasound.

## 2020-06-03 ENCOUNTER — TELEPHONE (OUTPATIENT)
Dept: BARIATRICS/WEIGHT MGMT | Age: 50
End: 2020-06-03

## 2020-10-28 ENCOUNTER — TELEPHONE (OUTPATIENT)
Dept: VASCULAR SURGERY | Age: 50
End: 2020-10-28

## 2020-11-03 PROBLEM — I63.9 CVA (CEREBRAL VASCULAR ACCIDENT) (HCC): Status: RESOLVED | Noted: 2020-04-11 | Resolved: 2020-11-03

## 2021-04-15 ENCOUNTER — IMMUNIZATION (OUTPATIENT)
Dept: PRIMARY CARE CLINIC | Age: 51
End: 2021-04-15
Payer: COMMERCIAL

## 2021-04-15 PROCEDURE — 0011A COVID-19, MODERNA VACCINE 100MCG/0.5ML DOSE: CPT | Performed by: NURSE PRACTITIONER

## 2021-04-15 PROCEDURE — 91301 COVID-19, MODERNA VACCINE 100MCG/0.5ML DOSE: CPT | Performed by: NURSE PRACTITIONER

## 2021-05-13 ENCOUNTER — IMMUNIZATION (OUTPATIENT)
Dept: PRIMARY CARE CLINIC | Age: 51
End: 2021-05-13
Payer: COMMERCIAL

## 2021-05-13 PROCEDURE — 91301 COVID-19, MODERNA VACCINE 100MCG/0.5ML DOSE: CPT | Performed by: NURSE PRACTITIONER

## 2021-05-13 PROCEDURE — 0012A COVID-19, MODERNA VACCINE 100MCG/0.5ML DOSE: CPT | Performed by: NURSE PRACTITIONER

## 2022-10-18 ENCOUNTER — APPOINTMENT (OUTPATIENT)
Dept: GENERAL RADIOLOGY | Age: 52
End: 2022-10-18
Payer: COMMERCIAL

## 2022-10-18 ENCOUNTER — APPOINTMENT (OUTPATIENT)
Dept: CT IMAGING | Age: 52
End: 2022-10-18
Payer: COMMERCIAL

## 2022-10-18 ENCOUNTER — HOSPITAL ENCOUNTER (OUTPATIENT)
Age: 52
Setting detail: OBSERVATION
Discharge: HOME | End: 2022-10-19
Attending: EMERGENCY MEDICINE | Admitting: FAMILY MEDICINE
Payer: COMMERCIAL

## 2022-10-18 DIAGNOSIS — R29.90 STROKE-LIKE SYMPTOMS: Primary | ICD-10-CM

## 2022-10-18 LAB
ANION GAP SERPL CALCULATED.3IONS-SCNC: 11 MMOL/L (ref 7–16)
APTT: 29 SEC (ref 24.5–35.1)
BASOPHILS ABSOLUTE: 0.04 E9/L (ref 0–0.2)
BASOPHILS RELATIVE PERCENT: 0.5 % (ref 0–2)
BUN BLDV-MCNC: 8 MG/DL (ref 6–20)
CALCIUM SERPL-MCNC: 9.6 MG/DL (ref 8.6–10.2)
CHLORIDE BLD-SCNC: 104 MMOL/L (ref 98–107)
CO2: 25 MMOL/L (ref 22–29)
CREAT SERPL-MCNC: 0.7 MG/DL (ref 0.5–1)
EOSINOPHILS ABSOLUTE: 1.3 E9/L (ref 0.05–0.5)
EOSINOPHILS RELATIVE PERCENT: 16.9 % (ref 0–6)
GFR SERPL CREATININE-BSD FRML MDRD: >60 ML/MIN/1.73
GLUCOSE BLD-MCNC: 94 MG/DL (ref 74–99)
HCT VFR BLD CALC: 45.4 % (ref 34–48)
HEMOGLOBIN: 15.5 G/DL (ref 11.5–15.5)
IMMATURE GRANULOCYTES #: 0.02 E9/L
IMMATURE GRANULOCYTES %: 0.3 % (ref 0–5)
INR BLD: 1.1
LYMPHOCYTES ABSOLUTE: 2.97 E9/L (ref 1.5–4)
LYMPHOCYTES RELATIVE PERCENT: 38.6 % (ref 20–42)
MCH RBC QN AUTO: 30.2 PG (ref 26–35)
MCHC RBC AUTO-ENTMCNC: 34.1 % (ref 32–34.5)
MCV RBC AUTO: 88.5 FL (ref 80–99.9)
METER GLUCOSE: 85 MG/DL (ref 74–99)
MONOCYTES ABSOLUTE: 0.33 E9/L (ref 0.1–0.95)
MONOCYTES RELATIVE PERCENT: 4.3 % (ref 2–12)
NEUTROPHILS ABSOLUTE: 3.04 E9/L (ref 1.8–7.3)
NEUTROPHILS RELATIVE PERCENT: 39.4 % (ref 43–80)
PDW BLD-RTO: 12.3 FL (ref 11.5–15)
PLATELET # BLD: 236 E9/L (ref 130–450)
PMV BLD AUTO: 9.6 FL (ref 7–12)
POTASSIUM REFLEX MAGNESIUM: 4 MMOL/L (ref 3.5–5)
PROTHROMBIN TIME: 11.4 SEC (ref 9.3–12.4)
RBC # BLD: 5.13 E12/L (ref 3.5–5.5)
SODIUM BLD-SCNC: 140 MMOL/L (ref 132–146)
TROPONIN, HIGH SENSITIVITY: 7 NG/L (ref 0–9)
WBC # BLD: 7.7 E9/L (ref 4.5–11.5)

## 2022-10-18 PROCEDURE — 96374 THER/PROPH/DIAG INJ IV PUSH: CPT

## 2022-10-18 PROCEDURE — 70450 CT HEAD/BRAIN W/O DYE: CPT

## 2022-10-18 PROCEDURE — 85025 COMPLETE CBC W/AUTO DIFF WBC: CPT

## 2022-10-18 PROCEDURE — 70498 CT ANGIOGRAPHY NECK: CPT

## 2022-10-18 PROCEDURE — 0042T CT BRAIN PERFUSION: CPT | Performed by: RADIOLOGY

## 2022-10-18 PROCEDURE — 93005 ELECTROCARDIOGRAM TRACING: CPT | Performed by: STUDENT IN AN ORGANIZED HEALTH CARE EDUCATION/TRAINING PROGRAM

## 2022-10-18 PROCEDURE — 99285 EMERGENCY DEPT VISIT HI MDM: CPT

## 2022-10-18 PROCEDURE — 70450 CT HEAD/BRAIN W/O DYE: CPT | Performed by: RADIOLOGY

## 2022-10-18 PROCEDURE — 70496 CT ANGIOGRAPHY HEAD: CPT | Performed by: RADIOLOGY

## 2022-10-18 PROCEDURE — 85730 THROMBOPLASTIN TIME PARTIAL: CPT

## 2022-10-18 PROCEDURE — 70498 CT ANGIOGRAPHY NECK: CPT | Performed by: RADIOLOGY

## 2022-10-18 PROCEDURE — 84484 ASSAY OF TROPONIN QUANT: CPT

## 2022-10-18 PROCEDURE — 70496 CT ANGIOGRAPHY HEAD: CPT

## 2022-10-18 PROCEDURE — 71045 X-RAY EXAM CHEST 1 VIEW: CPT

## 2022-10-18 PROCEDURE — 36415 COLL VENOUS BLD VENIPUNCTURE: CPT

## 2022-10-18 PROCEDURE — 80048 BASIC METABOLIC PNL TOTAL CA: CPT

## 2022-10-18 PROCEDURE — 6360000004 HC RX CONTRAST MEDICATION: Performed by: RADIOLOGY

## 2022-10-18 PROCEDURE — 0042T CT BRAIN PERFUSION: CPT

## 2022-10-18 PROCEDURE — 85610 PROTHROMBIN TIME: CPT

## 2022-10-18 RX ADMIN — IOPAMIDOL 100 ML: 755 INJECTION, SOLUTION INTRAVENOUS at 23:04

## 2022-10-18 ASSESSMENT — PAIN - FUNCTIONAL ASSESSMENT: PAIN_FUNCTIONAL_ASSESSMENT: NONE - DENIES PAIN

## 2022-10-18 NOTE — Clinical Note
Discharge Plan[de-identified] Other/[facility])   Telemetry/Cardiac Monitoring Required?: Yes

## 2022-10-19 ENCOUNTER — APPOINTMENT (OUTPATIENT)
Dept: MRI IMAGING | Age: 52
End: 2022-10-19
Payer: COMMERCIAL

## 2022-10-19 VITALS
OXYGEN SATURATION: 98 % | RESPIRATION RATE: 16 BRPM | SYSTOLIC BLOOD PRESSURE: 122 MMHG | HEART RATE: 60 BPM | DIASTOLIC BLOOD PRESSURE: 78 MMHG | TEMPERATURE: 97.4 F | WEIGHT: 255 LBS | BODY MASS INDEX: 40.98 KG/M2 | HEIGHT: 66 IN

## 2022-10-19 PROBLEM — R29.90 STROKE-LIKE SYMPTOMS: Status: ACTIVE | Noted: 2022-10-19

## 2022-10-19 LAB
ALBUMIN SERPL-MCNC: 3.6 G/DL (ref 3.5–5.2)
ALP BLD-CCNC: 106 U/L (ref 35–104)
ALT SERPL-CCNC: 11 U/L (ref 0–32)
AMPHETAMINE SCREEN, URINE: NOT DETECTED
ANION GAP SERPL CALCULATED.3IONS-SCNC: 9 MMOL/L (ref 7–16)
AST SERPL-CCNC: 12 U/L (ref 0–31)
BACTERIA: NORMAL /HPF
BARBITURATE SCREEN URINE: NOT DETECTED
BENZODIAZEPINE SCREEN, URINE: NOT DETECTED
BILIRUB SERPL-MCNC: 0.3 MG/DL (ref 0–1.2)
BILIRUBIN URINE: NEGATIVE
BLOOD, URINE: NORMAL
BUN BLDV-MCNC: 9 MG/DL (ref 6–20)
CALCIUM SERPL-MCNC: 8.8 MG/DL (ref 8.6–10.2)
CANNABINOID SCREEN URINE: NOT DETECTED
CHLORIDE BLD-SCNC: 106 MMOL/L (ref 98–107)
CHOLESTEROL, TOTAL: 131 MG/DL (ref 0–199)
CLARITY: CLEAR
CO2: 24 MMOL/L (ref 22–29)
COCAINE METABOLITE SCREEN URINE: NOT DETECTED
COLOR: YELLOW
CREAT SERPL-MCNC: 0.7 MG/DL (ref 0.5–1)
EKG ATRIAL RATE: 51 BPM
EKG P AXIS: 62 DEGREES
EKG P-R INTERVAL: 152 MS
EKG Q-T INTERVAL: 426 MS
EKG QRS DURATION: 84 MS
EKG QTC CALCULATION (BAZETT): 392 MS
EKG R AXIS: 75 DEGREES
EKG T AXIS: 73 DEGREES
EKG VENTRICULAR RATE: 51 BPM
EPITHELIAL CELLS, UA: NORMAL /HPF
FENTANYL SCREEN, URINE: NOT DETECTED
GFR SERPL CREATININE-BSD FRML MDRD: >60 ML/MIN/1.73
GLUCOSE BLD-MCNC: 106 MG/DL (ref 74–99)
GLUCOSE URINE: NEGATIVE MG/DL
HBA1C MFR BLD: 5.3 % (ref 4–5.6)
HCT VFR BLD CALC: 41.9 % (ref 34–48)
HDLC SERPL-MCNC: 49 MG/DL
HEMOGLOBIN: 14.2 G/DL (ref 11.5–15.5)
KETONES, URINE: NEGATIVE MG/DL
LDL CHOLESTEROL CALCULATED: 72 MG/DL (ref 0–99)
LEUKOCYTE ESTERASE, URINE: NEGATIVE
Lab: NORMAL
MCH RBC QN AUTO: 30.2 PG (ref 26–35)
MCHC RBC AUTO-ENTMCNC: 33.9 % (ref 32–34.5)
MCV RBC AUTO: 89.1 FL (ref 80–99.9)
METHADONE SCREEN, URINE: NOT DETECTED
NITRITE, URINE: NEGATIVE
OPIATE SCREEN URINE: NOT DETECTED
OXYCODONE URINE: NOT DETECTED
PDW BLD-RTO: 12.3 FL (ref 11.5–15)
PH UA: 5 (ref 5–9)
PHENCYCLIDINE SCREEN URINE: NOT DETECTED
PLATELET # BLD: 221 E9/L (ref 130–450)
PMV BLD AUTO: 9.9 FL (ref 7–12)
POTASSIUM REFLEX MAGNESIUM: 3.9 MMOL/L (ref 3.5–5)
PROTEIN UA: NEGATIVE MG/DL
RBC # BLD: 4.7 E12/L (ref 3.5–5.5)
RBC UA: NORMAL /HPF (ref 0–2)
SODIUM BLD-SCNC: 139 MMOL/L (ref 132–146)
SPECIFIC GRAVITY UA: <=1.005 (ref 1–1.03)
TOTAL PROTEIN: 6 G/DL (ref 6.4–8.3)
TRIGL SERPL-MCNC: 52 MG/DL (ref 0–149)
UROBILINOGEN, URINE: 1 E.U./DL
VLDLC SERPL CALC-MCNC: 10 MG/DL
WBC # BLD: 8.4 E9/L (ref 4.5–11.5)
WBC UA: NORMAL /HPF (ref 0–5)

## 2022-10-19 PROCEDURE — 2580000003 HC RX 258: Performed by: STUDENT IN AN ORGANIZED HEALTH CARE EDUCATION/TRAINING PROGRAM

## 2022-10-19 PROCEDURE — G0378 HOSPITAL OBSERVATION PER HR: HCPCS

## 2022-10-19 PROCEDURE — 80053 COMPREHEN METABOLIC PANEL: CPT

## 2022-10-19 PROCEDURE — 6360000002 HC RX W HCPCS: Performed by: FAMILY MEDICINE

## 2022-10-19 PROCEDURE — 96372 THER/PROPH/DIAG INJ SC/IM: CPT

## 2022-10-19 PROCEDURE — 81001 URINALYSIS AUTO W/SCOPE: CPT

## 2022-10-19 PROCEDURE — 6360000002 HC RX W HCPCS: Performed by: STUDENT IN AN ORGANIZED HEALTH CARE EDUCATION/TRAINING PROGRAM

## 2022-10-19 PROCEDURE — 80307 DRUG TEST PRSMV CHEM ANLYZR: CPT

## 2022-10-19 PROCEDURE — 6370000000 HC RX 637 (ALT 250 FOR IP): Performed by: PSYCHIATRY & NEUROLOGY

## 2022-10-19 PROCEDURE — 6370000000 HC RX 637 (ALT 250 FOR IP): Performed by: FAMILY MEDICINE

## 2022-10-19 PROCEDURE — 99226 PR SBSQ OBSERVATION CARE/DAY 35 MINUTES: CPT | Performed by: PSYCHIATRY & NEUROLOGY

## 2022-10-19 PROCEDURE — 96374 THER/PROPH/DIAG INJ IV PUSH: CPT

## 2022-10-19 PROCEDURE — 70551 MRI BRAIN STEM W/O DYE: CPT

## 2022-10-19 PROCEDURE — 80061 LIPID PANEL: CPT

## 2022-10-19 PROCEDURE — 96375 TX/PRO/DX INJ NEW DRUG ADDON: CPT

## 2022-10-19 PROCEDURE — 83036 HEMOGLOBIN GLYCOSYLATED A1C: CPT

## 2022-10-19 PROCEDURE — 85027 COMPLETE CBC AUTOMATED: CPT

## 2022-10-19 RX ORDER — DIPHENHYDRAMINE HYDROCHLORIDE 50 MG/ML
25 INJECTION INTRAMUSCULAR; INTRAVENOUS ONCE
Status: COMPLETED | OUTPATIENT
Start: 2022-10-19 | End: 2022-10-19

## 2022-10-19 RX ORDER — ATORVASTATIN CALCIUM 40 MG/1
80 TABLET, FILM COATED ORAL NIGHTLY
Status: DISCONTINUED | OUTPATIENT
Start: 2022-10-19 | End: 2022-10-19 | Stop reason: HOSPADM

## 2022-10-19 RX ORDER — CLOPIDOGREL 300 MG/1
300 TABLET, FILM COATED ORAL ONCE
Status: DISCONTINUED | OUTPATIENT
Start: 2022-10-19 | End: 2022-10-19

## 2022-10-19 RX ORDER — PROCHLORPERAZINE EDISYLATE 5 MG/ML
10 INJECTION INTRAMUSCULAR; INTRAVENOUS ONCE
Status: COMPLETED | OUTPATIENT
Start: 2022-10-19 | End: 2022-10-19

## 2022-10-19 RX ORDER — ASPIRIN 81 MG/1
81 TABLET ORAL DAILY
Status: DISCONTINUED | OUTPATIENT
Start: 2022-10-19 | End: 2022-10-19 | Stop reason: HOSPADM

## 2022-10-19 RX ORDER — 0.9 % SODIUM CHLORIDE 0.9 %
1000 INTRAVENOUS SOLUTION INTRAVENOUS ONCE
Status: COMPLETED | OUTPATIENT
Start: 2022-10-19 | End: 2022-10-19

## 2022-10-19 RX ORDER — ASPIRIN 300 MG/1
300 SUPPOSITORY RECTAL DAILY
Status: DISCONTINUED | OUTPATIENT
Start: 2022-10-19 | End: 2022-10-19 | Stop reason: HOSPADM

## 2022-10-19 RX ORDER — KETOROLAC TROMETHAMINE 30 MG/ML
15 INJECTION, SOLUTION INTRAMUSCULAR; INTRAVENOUS ONCE
Status: DISCONTINUED | OUTPATIENT
Start: 2022-10-19 | End: 2022-10-19

## 2022-10-19 RX ORDER — POLYETHYLENE GLYCOL 3350 17 G/17G
17 POWDER, FOR SOLUTION ORAL DAILY PRN
Status: DISCONTINUED | OUTPATIENT
Start: 2022-10-19 | End: 2022-10-19 | Stop reason: HOSPADM

## 2022-10-19 RX ORDER — ENOXAPARIN SODIUM 100 MG/ML
30 INJECTION SUBCUTANEOUS 2 TIMES DAILY
Status: DISCONTINUED | OUTPATIENT
Start: 2022-10-19 | End: 2022-10-19 | Stop reason: HOSPADM

## 2022-10-19 RX ORDER — CLOPIDOGREL BISULFATE 75 MG/1
75 TABLET ORAL DAILY
Qty: 30 TABLET | Refills: 3 | Status: SHIPPED | OUTPATIENT
Start: 2022-10-20

## 2022-10-19 RX ORDER — ONDANSETRON 2 MG/ML
4 INJECTION INTRAMUSCULAR; INTRAVENOUS EVERY 6 HOURS PRN
Status: DISCONTINUED | OUTPATIENT
Start: 2022-10-19 | End: 2022-10-19 | Stop reason: HOSPADM

## 2022-10-19 RX ORDER — CLOPIDOGREL BISULFATE 75 MG/1
75 TABLET ORAL DAILY
Status: DISCONTINUED | OUTPATIENT
Start: 2022-10-20 | End: 2022-10-19 | Stop reason: HOSPADM

## 2022-10-19 RX ORDER — CLOPIDOGREL BISULFATE 75 MG/1
150 TABLET ORAL ONCE
Status: COMPLETED | OUTPATIENT
Start: 2022-10-19 | End: 2022-10-19

## 2022-10-19 RX ORDER — ONDANSETRON 4 MG/1
4 TABLET, ORALLY DISINTEGRATING ORAL EVERY 8 HOURS PRN
Status: DISCONTINUED | OUTPATIENT
Start: 2022-10-19 | End: 2022-10-19 | Stop reason: HOSPADM

## 2022-10-19 RX ADMIN — PROCHLORPERAZINE EDISYLATE 10 MG: 5 INJECTION INTRAMUSCULAR; INTRAVENOUS at 00:52

## 2022-10-19 RX ADMIN — DIPHENHYDRAMINE HYDROCHLORIDE 25 MG: 50 INJECTION, SOLUTION INTRAMUSCULAR; INTRAVENOUS at 00:50

## 2022-10-19 RX ADMIN — ASPIRIN 81 MG: 81 TABLET, COATED ORAL at 09:55

## 2022-10-19 RX ADMIN — CLOPIDOGREL BISULFATE 150 MG: 75 TABLET ORAL at 11:29

## 2022-10-19 RX ADMIN — SODIUM CHLORIDE 1000 ML: 9 INJECTION, SOLUTION INTRAVENOUS at 00:53

## 2022-10-19 RX ADMIN — ENOXAPARIN SODIUM 30 MG: 100 INJECTION SUBCUTANEOUS at 09:56

## 2022-10-19 ASSESSMENT — ENCOUNTER SYMPTOMS
SINUS PRESSURE: 0
SHORTNESS OF BREATH: 0
DIARRHEA: 0
EYE REDNESS: 0
CONSTIPATION: 0
COUGH: 0
CHEST TIGHTNESS: 0
ABDOMINAL PAIN: 0
SINUS PAIN: 0
NAUSEA: 0
VOMITING: 0
EYE PAIN: 0
BACK PAIN: 0

## 2022-10-19 NOTE — ED PROVIDER NOTES
[facility]     Written by: Margaret Quach DO  Patient Name: Snatana Garcia  Attending Provider: Breana Anthony DO  Admit Date: 10/18/2022 10:34 PM  MRN: [id number]                   : 1970        Chief Complaint   Patient presents with    Altered Mental Status     Left sided flaccid. From [employer]. Had 3 teeth pulled at 9pm. Hx of stroke with right sided leg weakness.     - Chief complaint    Ms. Elana Brasher is a 47year old female who presents to the ED due to a possible stroke. Patient presents from residential due to left arm weakness. She states that she was getting her teeth pulled around 9 PM when she first noticed the pain. She had 3 teeth pulled and has a bloody bandage in her mouth. She does have a history of a prior CVA with residual right-sided leg weakness. States that she has severe left-sided sensory deficit and is unable to move her left arm at all. She is associated left leg weakness as well. She has a mild left-sided facial droop. Symptoms have been constant since onset. Denies any aggravating relieving factors. Denies any other systemic symptoms such as fever, chills, nausea, vomiting, chest pain, shortness of breath, abdominal pain, bowel or urinary changes, headaches or vision changes. Review of Systems   Constitutional:  Negative for chills, fatigue and fever. HENT:  Negative for congestion, sinus pressure and sinus pain. Eyes:  Negative for pain and redness. Respiratory:  Negative for cough, chest tightness and shortness of breath. Cardiovascular:  Negative for chest pain, palpitations and leg swelling. Gastrointestinal:  Negative for abdominal pain, constipation, diarrhea, nausea and vomiting. Genitourinary:  Negative for dysuria, flank pain, frequency, hematuria and urgency. Musculoskeletal:  Negative for arthralgias, back pain, gait problem, joint swelling and myalgias. Skin:  Negative for rash. Neurological:  Positive for weakness (Left arm and leg) and numbness (Left arm and leg). Negative for dizziness, tremors, syncope, light-headedness and headaches. Physical Exam  Constitutional:       General: She is not in acute distress. Appearance: Normal appearance. She is normal weight. She is not ill-appearing or toxic-appearing. HENT:      Head: Normocephalic and atraumatic. Right Ear: External ear normal.      Left Ear: External ear normal.      Mouth/Throat:      Mouth: Mucous membranes are moist.      Pharynx: Oropharynx is clear. Eyes:      Extraocular Movements: Extraocular movements intact. Conjunctiva/sclera: Conjunctivae normal.   Cardiovascular:      Rate and Rhythm: Normal rate and regular rhythm. Pulses: Normal pulses. Heart sounds: Normal heart sounds. Pulmonary:      Effort: Pulmonary effort is normal. No respiratory distress. Breath sounds: Normal breath sounds. No wheezing. Abdominal:      General: Abdomen is flat. Bowel sounds are normal. There is no distension. Palpations: Abdomen is soft. Tenderness: There is no abdominal tenderness. There is no guarding. Musculoskeletal:         General: No swelling or tenderness. Normal range of motion. Cervical back: Normal range of motion and neck supple. No rigidity. Skin:     General: Skin is warm and dry. Findings: No erythema or rash. Neurological:      General: No focal deficit present. Mental Status: She is alert and oriented to person, place, and time. Mental status is at baseline. Cranial Nerves: Facial asymmetry (Mild facial droop on left) present. No dysarthria. Sensory: Sensory deficit (Left arm and leg) present. Motor: Weakness (Left arm > left leg) present.    Psychiatric:         Mood and Affect: Mood normal.         Behavior: Behavior normal.          NIH Stroke Scale/Score at time of initial evaluation:  1A: Level of Consciousness 0 - alert; keenly responsive   1B: Ask Month and Age 0 - answers both questions correctly   1C: Tell Patient To Open and Close Eyes, then Hand  Squeeze 0 - performs both tasks correctly   2: Test Horizontal Extraocular Movements 0 - normal   3: Test Visual Fields 0 - no visual loss   4: Test Facial Palsy 1 - minor paralysis (flattened nasolabial fold, asymmetric on smiling)   5A: Test Left Arm Motor Drift 4 - no movement   5B: Test Right Arm Motor Drift 0 - no drift, limb holds 90 (or 45) degrees for full 10 seconds   6A: Test Left Leg Motor Drift 2 - some effort against gravity; leg falls to bed by 5 seconds but has some effort against gravity   6B: Test Right Leg Motor Drift 0 - no drift; leg holds 30 degree position for full 5 seconds   7: Test Limb Ataxia   (FNF/Heel-Shin) 2 - present in two limbs   8: Test Sensation 2 - severe to total sensory loss; patient is not aware of being touched in face, arm, leg   9: Test Language/Aphasia 0 - no aphasia, normal   10: Test Dysarthria 0 - normal   11: Test Extinction/Inattention 0 - no abnormality   Total 11         Procedures       MDM  Number of Diagnoses or Management Options  Stroke-like symptoms  Diagnosis management comments: This is a 47year old female who presents to the ED due to a possible stroke. Stroke alert was called and imaging was negative for any signs of LVO or other acute intracranial abnormalities. CBC and BMP were markedly benign within normal limits. Troponin was 7. Coags were normal.  Glucose was 85. Chest x-ray revealed no acute process. Patient was given migraine cocktail of Benadryl, Compazine and 1 L normal saline bolus. Patient was reevaluated and still complained of left arm numbness. She will be admitted to the hospital for further work-up of her strokelike symptoms by Dr. Josafat Schaeffer at this time.                 --------------------------------------------- PAST HISTORY ---------------------------------------------  Past Medical History:  has a past medical history of CVA (cerebral vascular accident) ([facility] Hyperlipidemia, Morbid (severe) obesity due to excess calories ([facility] and Pulmonary emphysema ([facility] Past Surgical History:  has a past surgical history that includes Cholecystectomy;  section; Tubal ligation; Tubal ligation; and Carotid endarterectomy (Left, 4/15/2020). Social History:  reports that she has been smoking cigarettes. She has a 14.00 pack-year smoking history. She has never used smokeless tobacco. She reports current alcohol use of about 1.0 standard drink per week. She reports that she does not use drugs. Family History: family history includes Diabetes in her mother; High Blood Pressure in her mother; Other in her mother. The patients home medications have been reviewed. Allergies: Patient has no known allergies.     -------------------------------------------------- RESULTS -------------------------------------------------    LABS:  Results for orders placed or performed during the hospital encounter of 10/18/22   CBC with Auto Differential   Result Value Ref Range    WBC 7.7 4.5 - 11.5 E9/L    RBC 5.13 3.50 - 5.50 E12/L    Hemoglobin 15.5 11.5 - 15.5 g/dL    Hematocrit 45.4 34.0 - 48.0 %    MCV 88.5 80.0 - 99.9 fL    MCH 30.2 26.0 - 35.0 pg    MCHC 34.1 32.0 - 34.5 %    RDW 12.3 11.5 - 15.0 fL    Platelets 141 707 - 698 E9/L    MPV 9.6 7.0 - 12.0 fL    Neutrophils % 39.4 (L) 43.0 - 80.0 %    Immature Granulocytes % 0.3 0.0 - 5.0 %    Lymphocytes % 38.6 20.0 - 42.0 %    Monocytes % 4.3 2.0 - 12.0 %    Eosinophils % 16.9 (H) 0.0 - 6.0 %    Basophils % 0.5 0.0 - 2.0 %    Neutrophils Absolute 3.04 1.80 - 7.30 E9/L    Immature Granulocytes # 0.02 E9/L    Lymphocytes Absolute 2.97 1.50 - 4.00 E9/L    Monocytes Absolute 0.33 0.10 - 0.95 E9/L    Eosinophils Absolute 1.30 (H) 0.05 - 0.50 E9/L    Basophils Absolute 0.04 0.00 - 0.20 D7/E   Basic Metabolic Panel w/ Reflex to MG   Result Value Ref Range    Sodium 140 132 - 146 mmol/L    Potassium reflex Magnesium 4.0 3.5 - 5.0 mmol/L    Chloride 104 98 - 107 mmol/L    CO2 25 22 - 29 mmol/L    Anion Gap 11 7 - 16 mmol/L    Glucose 94 74 - 99 mg/dL    BUN 8 6 - 20 mg/dL    Creatinine 0.7 0.5 - 1.0 mg/dL    Est, Glom Filt Rate >60 >=60 mL/min/1.73    Calcium 9.6 8.6 - 10.2 mg/dL   Troponin   Result Value Ref Range    Troponin, High Sensitivity 7 0 - 9 ng/L   APTT   Result Value Ref Range    aPTT 29.0 24.5 - 35.1 sec   Protime-INR   Result Value Ref Range    Protime 11.4 9.3 - 12.4 sec    INR 1.1    POCT Glucose   Result Value Ref Range    Meter Glucose 85 74 - 99 mg/dL   EKG 12 Lead   Result Value Ref Range    Ventricular Rate 54 BPM    Atrial Rate 54 BPM    P-R Interval 148 ms    QRS Duration 88 ms    Q-T Interval 424 ms    QTc Calculation (Bazett) 402 ms    P Axis 72 degrees    R Axis 75 degrees    T Axis 70 degrees       RADIOLOGY:  XR CHEST PORTABLE   Final Result   No acute process. CTA NECK W CONTRAST   Final Result   1. Estimated stenosis of the proximal right and left internal carotid   artery by NASCET criteria is not hemodynamically significant   2. Moderate atherosclerotic disease . 3. No large vessel occlusion identified            This study was analyzed by the [employer]. ai algorithm. CTA HEAD W CONTRAST   Final Result   1. Estimated stenosis of the proximal right and left internal carotid   artery by NASCET criteria is not hemodynamically significant   2. Moderate atherosclerotic disease . 3. No large vessel occlusion identified            This study was analyzed by the [employer]. ai algorithm. CT BRAIN PERFUSION   Final Result      No significant ischemic penumbra identified      This study was analyzed by the [employer]. ai algorithm. CT HEAD WO CONTRAST   Final Result   Unremarkable scan of the head. Findings were called to Dr. Demetria Bridges             EKG:  This EKG is signed and interpreted by me.     Rate: 54  Rhythm: Sinus  Interpretation: sinus bradycardia  Comparison: stable as compared to patient's most recent EKG      ------------------------- NURSING NOTES AND VITALS REVIEWED ---------------------------  Date / Time Roomed:  10/18/2022 10:34 PM  ED Bed Assignment:  05/05    The nursing notes within the ED encounter and vital signs as below have been reviewed. Patient Vitals for the past 24 hrs:   BP Temp Pulse Resp SpO2   10/18/22 2345 (!) 141/85 -- 53 20 97 %   10/18/22 2235 (!) 160/124 97.4 °F (36.3 °C) 69 18 95 %       Oxygen Saturation Interpretation: Normal    ------------------------------------------ PROGRESS NOTES ------------------------------------------  Re-evaluation(s):  Time: 0045  Patients symptoms show no change  Repeat physical examination is not changed    Counseling:  I have spoken with the patient and discussed todays results, in addition to providing specific details for the plan of care and counseling regarding the diagnosis and prognosis. Their questions are answered at this time and they are agreeable with the plan of admission.    --------------------------------- ADDITIONAL PROVIDER NOTES ---------------------------------  Consultations:  Time: 0100. Spoke with Dr. Rosaura Corrales. Discussed case. They will admit the patient. This patient's ED course included: a personal history and physicial examination, re-evaluation prior to disposition, multiple bedside re-evaluations, and IV medications    This patient has remained hemodynamically stable during their ED course. Diagnosis:  1. Stroke-like symptoms        Disposition:  Patient's disposition: Admit to telemetry  Patient's condition is stable. Patient was seen and evaluated by myself and my attending Grisel Parker DO. Assessment and Plan discussed with attending provider, please see attestation for final plan of care.      DO Jon Ruvalcaba DO  Resident  10/19/22 325 Cameron Sutton DO  Resident  10/19/22 1937

## 2022-10-19 NOTE — PROGRESS NOTES
Dr. Josafat Schaeffer,    Your patient is on a medication that requires a renal and/or weight dose adjustment. Renal/Body Weight Function Assessment:    Date Body Weight IBW  Adjusted BW SCr  CrCl Dialysis status   10/19/2022 255 lb (115.7 kg) Patient height not recorded Creatinine clearance cannot be calculated (Unknown ideal weight.) N/a       Pharmacy has dose-adjusted the following medication(s):    Date Previous Order Adjusted Order   10/19/2022 Lovenox 40 mg qd Lovenox 30 mg bid       These changes were made per protocol according to the [facility] Renal Dosing Policy/ [facility] Pharmacist Anticoagulant Review. *Please note this dose may need readjusted if your patient's condition changes. Please contact pharmacy with any questions regarding these changes.     Júnior Bailey, PharmD, BCPS 10/19/2022 9:34 AM

## 2022-10-19 NOTE — DISCHARGE SUMMARY
Hospitalist Discharge Summary    Patient ID: Kevin Wetzel   Patient : 1970  Patient's PCP: Nicki Lubin MD    Admit Date: 10/18/2022   Admitting Physician: Cara Hercules DO    Discharge Date:  10/19/2022   Discharge Physician: MANUEL Acevedo CNP   Discharge Condition: Stable  Discharge Disposition: [facility] course in brief:  Briefly, [facility] with strokelike symptoms. Left arm is flaccid. Has a history of previous stroke with right-sided weakness. Patient did have 3 teeth pulled on 10/18/2022 and has residual pain from that. CT of the head and neck and CTA head neck brain perfusion were all unremarkable. During hospital course patient's symptoms resolved. MRI was unremarkable, patient stable and okay to discharge from neurological and medical standpoint. Consults:   IP CONSULT TO INTERNAL MEDICINE  IP CONSULT TO NEUROLOGY    Discharge Diagnoses:  Strokelike symptoms, patient with complaints of left arm being flaccid  History of hyperlipidemia  History of hypertension  History of previous CVA with right side residual defects      Discharge Instructions / Follow up:    No future appointments. The patient's condition is stable. At this time the patient is without objective evidence of an acute process requiring continuing hospitalization or inpatient management. They are stable for discharge with outpatient follow-up. I have spoken with the patient and discussed the results of the current hospitalization, in addition to providing specific details for the plan of care and counseling regarding the diagnosis and prognosis. The plan has been discussed in detail and they are aware of the specific conditions for emergent return, as well as the importance of follow-up. Their questions are answered at this time and they are agreeable with the plan for discharge to home.     Continued appropriate risk factor modification of blood pressure, diabetes and serum lipids will remain essential to reducing risk of future atherosclerotic development    Activity: activity as tolerated    Physical exam:  General appearance: No apparent distress, appears stated age and cooperative. HEENT: Normal cephalic, atraumatic without obvious deformity. Pupils equal, round, and reactive to light. Extra ocular muscles intact. Conjunctivae/corneas clear. Neck: Supple, with full range of motion. No jugular venous distention. Trachea midline. Respiratory:  Clear to auscultation bilaterally. No apparent distress. Cardiovascular:  Regular rate and rhythm. S1, S2 without murmurs, rubs, or gallops. PV: Brisk capillary refill. +2 pedal and radial pulses bilaterally. No clubbing, cyanosis, edema of bilateral lower extremities. Abdomen: Soft, non-tender, non-distended. +BS  Musculoskeletal: No obvious deformities or erythematous or edematous joints. Skin: Normal skin color. No rashes or lesions. Neurologic:  Neurovascularly intact without any focal sensory/motor deficits. Cranial nerves: II-XII intact, grossly non-focal.  Psychiatric: Alert and oriented, thought content appropriate, normal insight    Significant labs:  CBC:   Recent Labs     10/18/22  2240 10/19/22  0520   WBC 7.7 8.4   RBC 5.13 4.70   HGB 15.5 14.2   HCT 45.4 41.9   MCV 88.5 89.1   RDW 12.3 12.3    221     BMP:   Recent Labs     10/18/22  2240 10/19/22  0520    139   K 4.0 3.9    106   CO2 25 24   BUN 8 9   CREATININE 0.7 0.7     LFT:  Recent Labs     10/19/22  0520   PROT 6.0*   ALKPHOS 106*   ALT 11   AST 12   BILITOT 0.3     PT/INR:   Recent Labs     10/18/22  2240   INR 1.1   APTT 29.0     BNP: No results for input(s): BNP in the last 72 hours.   Hgb A1C:   Lab Results   Component Value Date    LABA1C 5.3 10/19/2022     Folate and B12: No results found for: [id number], No results found for: FOLATE  Thyroid Studies:   Lab Results   Component Value Date    TSH 1.250 2014       Urinalysis:    Lab Results   Component Value Date/Time    NITRU Negative 10/19/2022 01:50 AM    WBCUA NONE 10/19/2022 01:50 AM    BACTERIA NONE SEEN 10/19/2022 01:50 AM    RBCUA 0-1 10/19/2022 01:50 AM    RBCUA 1-3 08/15/2013 02:23 PM    BLOODU TRACE-INTACT 10/19/2022 01:50 AM    SPECGRAV <=1.005 10/19/2022 01:50 AM    GLUCOSEU Negative 10/19/2022 01:50 AM       Imaging:  CT HEAD WO CONTRAST    Result Date: 10/18/2022  Patient MRN:  [id number] : 1970 Age: 47 years Gender: Female Order Date:  10/18/2022 10:41 PM EXAM: CT HEAD WO CONTRAST NUMBER OF IMAGES:  18 INDICATION:  stroke alert stroke alert Decision Support Exception - unselect if not a suspected or confirmed emergency medical condition->Emergency Medical Condition (MA) What reading provider will be dictating this exam?->[facility] COMPARISON: None Technique: Low-dose CT  acquisition technique included one of following options; 1 . Automated exposure control, 2. Adjustment of MA and or KV according to patient's size or 3. Use of iterative reconstruction. Multiple CT sections were obtained with sagittal and coronal MPR reconstructions. The ventricles are unremarkable. The gyri and sulci appear unremarkable. The white matter appears unremarkable. There is no evidence for hemorrhage. There is no infarct identified. There is no mass effect identified. There is no mass identified. Unremarkable scan of the head. Findings were called to Dr. Elizabeth Aguayo    Result Date: 10/19/2022  EXAMINATION: ONE XRAY VIEW OF THE CHEST 10/18/2022 11:58 pm COMPARISON: 2019 HISTORY: ORDERING SYSTEM PROVIDED HISTORY: stroke TECHNOLOGIST PROVIDED HISTORY: Reason for exam:->stroke What reading provider will be dictating this exam?->[facility] FINDINGS: The lungs are without acute focal process. There is no effusion or pneumothorax. The cardiomediastinal silhouette is without acute process. The osseous structures are without acute process.      No acute process. CTA NECK W CONTRAST    Result Date: 10/18/2022  Patient MRN:  [id number] : 1970 Age: 47 years Gender: Female Order Date:  10/18/2022 10:41 PM EXAM: CTA NECK W CONTRAST, CTA HEAD W CONTRAST NUMBER OF IMAGES:  65 INDICATION:  stroke alert stroke alert Decision Support Exception - unselect if not a suspected or confirmed emergency medical condition->Emergency Medical Condition (MA) What reading provider will be dictating this exam?->[facility] COMPARISON: None Technique: Low-dose CT  acquisition technique included one of following options; 1 . Automated exposure control, 2. Adjustment of MA and or KV according to patient's size or 3. Use of iterative reconstruction. Contiguous spiral images were obtained in the axial plane, following the administration of intravenous contrast using CT angiographic protocol. Sagittal and coronal images were reconstructed from the axial plane acquisition. Additional MIP reconstructions were presented to aid in the interpretation of this study. Images were obtained from the skull base cranially. There is moderate calcified plaque identified in the vessels compatible with atherosclerotic disease. The right carotid is abnormal. There is  evidence for hemodynamically significant stenosis at the level the proximal internal carotid artery. By NASCET criteria estimated stenosis is 50-70% The left carotid is mildly atherosclerotic without significant stenosis The right vertebral artery is mildly atherosclerotic without significant stenosis The left vertebral artery is mildly atherosclerotic without significant stenosis The basilar artery is unremarkable The middle cerebral arteries are unremarkable The anterior cerebral arteries are unremarkable The posterior cerebral arteries are unremarkable     1. Estimated stenosis of the proximal right and left internal carotid artery by NASCET criteria is not hemodynamically significant 2. Moderate atherosclerotic disease . 3.  No large vessel occlusion identified This study was analyzed by the [address] ai algorithm. CT BRAIN PERFUSION    Result Date: 10/18/2022  Patient MRN: [id number] : 1970 Age:  47 years Gender: Female Order Date: 10/18/2022 10:41 PM Exam: CT BRAIN PERFUSION Number of Images: 837 views Indication:   stroke alert stroke alert Decision Support Exception - unselect if not a suspected or confirmed emergency medical condition->Emergency Medical Condition (MA) What reading provider will be dictating this exam?->[facility] Comparison: None. Findings: Perfusion images demonstrate symmetric blood volume Blood flow images demonstrate symmetric blood flow There is no significant ischemic penumbra identified. There is no significant core infarct identified. No significant ischemic penumbra identified This study was analyzed by the [employer] ai algorithm. CTA HEAD W CONTRAST    Result Date: 10/18/2022  Patient MRN:  [id number] : 1970 Age: 47 years Gender: Female Order Date:  10/18/2022 10:41 PM EXAM: CTA NECK W CONTRAST, CTA HEAD W CONTRAST NUMBER OF IMAGES:  65 INDICATION:  stroke alert stroke alert Decision Support Exception - unselect if not a suspected or confirmed emergency medical condition->Emergency Medical Condition (MA) What reading provider will be dictating this exam?->[facility] COMPARISON: None Technique: Low-dose CT  acquisition technique included one of following options; 1 . Automated exposure control, 2. Adjustment of MA and or KV according to patient's size or 3. Use of iterative reconstruction. Contiguous spiral images were obtained in the axial plane, following the administration of intravenous contrast using CT angiographic protocol. Sagittal and coronal images were reconstructed from the axial plane acquisition. Additional MIP reconstructions were presented to aid in the interpretation of this study. Images were obtained from the skull base cranially.  There is moderate calcified plaque identified in the vessels compatible with atherosclerotic disease. The right carotid is abnormal. There is  evidence for hemodynamically significant stenosis at the level the proximal internal carotid artery. By NASCET criteria estimated stenosis is 50-70% The left carotid is mildly atherosclerotic without significant stenosis The right vertebral artery is mildly atherosclerotic without significant stenosis The left vertebral artery is mildly atherosclerotic without significant stenosis The basilar artery is unremarkable The middle cerebral arteries are unremarkable The anterior cerebral arteries are unremarkable The posterior cerebral arteries are unremarkable     1. Estimated stenosis of the proximal right and left internal carotid artery by NASCET criteria is not hemodynamically significant 2. Moderate atherosclerotic disease . 3. No large vessel occlusion identified This study was analyzed by the [employer] ai algorithm. MRI brain without contrast    Result Date: 10/19/2022  EXAMINATION: MRI OF THE BRAIN WITHOUT CONTRAST  10/19/2022 8:01 am TECHNIQUE: Multiplanar multisequence MRI of the brain was performed without the administration of intravenous contrast. COMPARISON: Head CT dated 10/18/2022 and MRI brain dated 04/13/2020 HISTORY: ORDERING SYSTEM PROVIDED HISTORY: Strokelike symptoms TECHNOLOGIST PROVIDED HISTORY: Reason for exam:->Strokelike symptoms What reading provider will be dictating this exam?->[facility] FINDINGS: INTRACRANIAL STRUCTURES/VENTRICLES: There is no acute infarct. Small old infarcts are seen in the left frontal and parietal subcortical white matter. There are mild chronic white matter ischemic changes. No mass effect or midline shift. Tiny focus of low signal is seen in the left frontal cortex on gradient echo series 4, image 19, probably an area of old petechial hemorrhage. No evidence of an acute intracranial hemorrhage. The ventricles and sulci are normal in size and configuration.   The sellar/suprasellar regions appear unremarkable. The normal signal voids within the major intracranial vessels appear maintained. ORBITS: The visualized portion of the orbits demonstrate no acute abnormality. SINUSES: The visualized paranasal sinuses and mastoid air cells demonstrate no acute abnormality. Mild mucosal thickening is seen in the maxillary, ethmoid and left frontal sinuses. Right frontal sinus is hypoplastic. BONES/SOFT TISSUES: The bone marrow signal intensity appears normal. The soft tissues demonstrate no acute abnormality. 1. No evidence of acute intracranial abnormality. 2. Small old left frontal and parietal infarcts. Tiny area of probable old petechial hemorrhage in the left frontal cortex. Discharge Medications:      Medication List        START taking these medications      aspirin 81 MG EC tablet  Take 1 tablet by mouth daily     atorvastatin 40 MG tablet  Commonly known as: LIPITOR  Take 1 tablet by mouth nightly     clopidogrel 75 MG tablet  Commonly known as: PLAVIX  Take 1 tablet by mouth daily  Start taking on: October 20, 2022               Where to Get Your Medications        These medications were sent to [address] [facility], [address] [facility] [phone] [address] [address] [address] [place] [address]      Phone: [phone]   clopidogrel 75 MG tablet         Time Spent on discharge is more than 45 minutes in the examination, evaluation, counseling and review of medications and discharge plan.    +++++++++++++++++++++++++++++++++++++++++++++++++  MANUEL Summers [address]  +++++++++++++++++++++++++++++++++++++++++++++++++  NOTE: This report was transcribed using voice recognition software. Every effort was made to ensure accuracy; however, inadvertent computerized transcription errors may be present.

## 2022-10-19 NOTE — DISCHARGE SUMMARY
Hospitalist Discharge Summary    Patient ID: Patrick Ram   Patient : 1970  Patient's PCP: Kristian Caraballo MD    Admit Date: 10/18/2022   Admitting Physician: Edd Patton DO    Discharge Date:  2022   Discharge Physician: Clarke Cogan, APRN - CNP   Discharge Condition: Stable  Discharge Disposition: [facility] course in brief:  (Please refer to daily progress notes for a comprehensive review of the hospitalization by requesting medical records)  Briefly, patient is from [facility] presented to ED with strokelike symptoms. Left arm is flaccid. Has a history of previous stroke with right-sided weakness. Patient did have 3 teeth pulled on 10/18/2022 and has residual pain from that. CT of the head and neck and CTA head neck brain perfusion were all unremarkable. During hospital course patient's symptoms resolved. MRI was unremarkable, patient stable and okay to discharge from neurological and medical standpoint. Consults:   IP CONSULT TO INTERNAL MEDICINE  IP CONSULT TO NEUROLOGY    Discharge Diagnoses:  -Strokelike symptoms  -Left arm flaccid  -Hyperlipidemia  -Hypertension  -History of previous CVA      Discharge Instructions / Follow up:    No future appointments. The patient's condition is stable home. At this time the patient is without objective evidence of an acute process requiring continuing hospitalization or inpatient management. They are stable for discharge with outpatient follow-up. I have spoken with the patient and discussed the results of the current hospitalization, in addition to providing specific details for the plan of care and counseling regarding the diagnosis and prognosis. The plan has been discussed in detail and they are aware of the specific conditions for emergent return, as well as the importance of follow-up. Their questions are answered at this time and they are agreeable with the plan for discharge to home.     Continued appropriate risk factor modification of blood pressure, diabetes and serum lipids will remain essential to reducing risk of future atherosclerotic development    Activity: activity as tolerated    Physical exam:  General appearance: No apparent distress, appears stated age and cooperative. HEENT: Normal cephalic, atraumatic without obvious deformity. Pupils equal, round, and reactive to light. Extra ocular muscles intact. Conjunctivae/corneas clear. Neck: Supple, with full range of motion. No jugular venous distention. Trachea midline. Respiratory:  Clear to auscultation bilaterally. No apparent distress. Cardiovascular:  Regular rate and rhythm. S1, S2 without murmurs, rubs, or gallops. PV: Brisk capillary refill. +2 pedal and radial pulses bilaterally. No clubbing, cyanosis, edema of bilateral lower extremities. Abdomen: Soft, non-tender, non-distended. +BS  Musculoskeletal: No obvious deformities or erythematous or edematous joints. Skin: Normal skin color. No rashes or lesions. Neurologic:  Neurovascularly intact without any focal sensory/motor deficits. Cranial nerves: II-XII intact, grossly non-focal.  Psychiatric: Alert and oriented, thought content appropriate, normal insight    Significant labs:  CBC:   No results for input(s): WBC, RBC, HGB, HCT, MCV, RDW, PLT in the last 72 hours. BMP: No results for input(s): NA, K, CL, CO2, BUN, CREATININE, CA, MG, PHOS in the last 72 hours. LFT:  No results for input(s): PROT, ALB, ALKPHOS, ALT, AST, BILITOT, AMYLASE, LIPASE in the last 72 hours. PT/INR: No results for input(s): INR, APTT in the last 72 hours. BNP: No results for input(s): BNP in the last 72 hours.   Hgb A1C:   Lab Results   Component Value Date    LABA1C 5.3 10/19/2022     Folate and B12: No results found for: [id number], No results found for: FOLATE  Thyroid Studies:   Lab Results   Component Value Date    TSH 1.250 05/02/2014       Urinalysis:    Lab Results   Component Value Date/Time    NITRU Negative 10/19/2022 01:50 AM    WBCUA NONE 10/19/2022 01:50 AM    BACTERIA NONE SEEN 10/19/2022 01:50 AM    RBCUA 0-1 10/19/2022 01:50 AM    RBCUA 1-3 08/15/2013 02:23 PM    BLOODU TRACE-INTACT 10/19/2022 01:50 AM    SPECGRAV <=1.005 10/19/2022 01:50 AM    GLUCOSEU Negative 10/19/2022 01:50 AM       Imaging:  No results found. Discharge Medications:      Medication List        START taking these medications      aspirin 81 MG EC tablet  Take 1 tablet by mouth daily     atorvastatin 40 MG tablet  Commonly known as: LIPITOR  Take 1 tablet by mouth nightly     clopidogrel 75 MG tablet  Commonly known as: PLAVIX  Take 1 tablet by mouth daily               Where to Get Your Medications        These medications were sent to [facility]/pharmacy #[id number] Pedro, [address] [place] [address], [facility] [address] [place] [address]      Phone: [phone]   clopidogrel 75 MG tablet         Time Spent on discharge is more than 45 minutes in the examination, evaluation, counseling and review of medications and discharge plan.    +++++++++++++++++++++++++++++++++++++++++++++++++  Devonte England, [address]  +++++++++++++++++++++++++++++++++++++++++++++++++  NOTE: This report was transcribed using voice recognition software. Every effort was made to ensure accuracy; however, inadvertent computerized transcription errors may be present.

## 2022-10-19 NOTE — CONSULTS
NEUROLOGY CONSULT NOTE       Requesting Physician: Surya Garrido MD     Reason for Consult:  Evaluate for \"strokelike symptoms\"      IMPRESSION:  With negative MRI, the patient wonders if her intermittent numbness and weakness in the left upper extremity might be related to panic attacks. She has had these frequently recently, and that certainly would be in the differential diagnosis. Stroke has been ruled out by MRI scanning but we cannot exclude these being TIAs. I am not highly suspicious for that but it would be a consideration in this patient who had had a previous stroke. Differential diagnosis would also include cervical radiculopathy. RECOMMENDATIONS:  She admits to being noncompliant with Lipitor and aspirin. These have been resumed and I agree with that. I am also adding Plavix which she can continue for a month and then she can go back to just baby aspirin daily. (Since she had a tooth extraction yesterday I am giving her just a partial load of the Plavix). She has not smoked for few weeks since she has been incarcerated, and she was encouraged to stay off the cigarettes. We can get an MRI scan of the cervical spine. If symptoms continue an EMG might be helpful. She should follow-up with psychiatry; it looks as if she has missed an appointment previously. History of Present Illness:  Teodora Voss is a 47 y.o. female admitted to [address] [place] [address] on 10/18/2022. The patient had been in group home but was sent in and discharged from group home due to left upper extremity weakness. It was described as being flaccid in the emergency department. She reports slight weakness but more numbness, as if it is asleep, and this has occurred very frequently in recent weeks. She has been in group home for a few weeks. It usually lasts just minutes. Has never lasted hours. Not present at the time of my examination. Denies significant neck pain.   No symptoms in the face other than the fact that she has some teeth pulled yesterday and she has some right facial pain. No changes in vision. No trouble with her lower extremities or walking. She admits to frequent panic attacks with hyperventilation and there does seem to her to be some correlation of this with the left upper extremity symptoms. Past Medical History:        Diagnosis Date    CVA (cerebral vascular accident) ([facility] 4/11/2020    Hyperlipidemia 4/12/2020    Morbid (severe) obesity due to excess calories ([facility]     Pulmonary emphysema ([facility] 1/13/2019       The patient had a left middle cerebral artery territory stroke in April 2020. She had a left carotid endarterectomy at that time for 70% stenosis of the left internal carotid artery.         Procedure Laterality Date    CAROTID ENDARTERECTOMY Left 4/15/2020    CAROTID ENDARTERECTOMY performed by Moriah Garcia MD at [address]         Social History:  Social History     Tobacco Use   Smoking Status Every Day    Packs/day: 0.50    Years: 28.00    Pack years: 14.00    Types: Cigarettes   Smokeless Tobacco Never     Social History     Substance and Sexual Activity   Alcohol Use Yes    Alcohol/week: 1.0 standard drink    Types: 1 Cans of beer per week    Comment: occasionally     Social History     Substance and Sexual Activity   Drug Use No     Single    Family History:       Problem Relation Age of Onset    Diabetes Mother     High Blood Pressure Mother     Other Mother        Review of Systems:  CONSTITUTIONAL:  negative for fever or recent illness  EYE:  No recent visual changes  ENT: Teeth extracted yesterday  RESPIRATORY: Often hyperventilates and has associated that with the left upper extremity numbness and weakness  CARDIOVASCULAR:  negative for chest pain  GASTROINTESTINAL:  negative for nausea  HEMATOLOGIC/LYMPHATIC:  negative for unusual bleeding  MUSCULOSKELETAL: No neck pain  BEHAVIOR/PSYCH: Significant anxiety and panic attacks  NEUROLOGIC:  see HPI    Allergies:    No Known Allergies     Current Medications:   ondansetron (ZOFRAN-ODT) disintegrating tablet 4 mg, Q8H PRN   Or  ondansetron (ZOFRAN) injection 4 mg, Q6H PRN  polyethylene glycol (GLYCOLAX) packet 17 g, Daily PRN  enoxaparin Sodium (LOVENOX) injection 30 mg, BID  aspirin EC tablet 81 mg, Daily   Or  aspirin suppository 300 mg, Daily  atorvastatin (LIPITOR) tablet 80 mg, Nightly  [START ON 10/20/2022] clopidogrel (PLAVIX) tablet 75 mg, Daily  clopidogrel (PLAVIX) tablet 300 mg, Once       Not in a hospital admission. Physical Exam:  /75   Pulse 59   Temp 97.4 °F (36.3 °C)   Resp 14   Ht 5' 6\" (1.676 m)   Wt 255 lb (115.7 kg)   LMP 05/19/2012   SpO2 97%   BMI 41.16 kg/m²  I Body mass index is 41.16 kg/m². I   Wt Readings from Last 1 Encounters:   10/19/22 255 lb (115.7 kg)        GENERAL: she is in no apparent distress, and appears stated age   I had the patient hyperventilate for a minute or so and this did not induce any symptoms. EYE: No ocular abnormalities  CARDIOVASCULAR:  Heart regular rate and rhythm. No carotid bruits detected. NEUROLOGIC:  Level of Alertness: alert  Orientation: oriented to person, place and time  Memory and Fund of Knowledge:  normal  Attention/Concentration: normal  Language: no aphasia  Cranial Nerves: pupils are equal; extraocular muscles intact; facial strength and sensation are intact; hearing is intact to soft voice; the palate raises midline, and the tongue protrudes midline; shoulder shrug is symmetric  Motor Exam: Normal tone in all extremities. Strength is MRC grade 5 except very minimal right hand weakness.   Sensory: Sensory symmetric to light touch  Coordination: Cerebellar function is intact for the nose-finger-nose maneuver, and for rapid alternating movements  Deep Tendon Reflexes: Brisk and symmetric  Abnormal movements: none  Station and gait: Normal    Diagnostics:  CBC:   Lab Results Component Value Date/Time    WBC 8.4 10/19/2022 05:20 AM    RBC 4.70 10/19/2022 05:20 AM    HGB 14.2 10/19/2022 05:20 AM    HCT 41.9 10/19/2022 05:20 AM    MCV 89.1 10/19/2022 05:20 AM    MCH 30.2 10/19/2022 05:20 AM    MCHC 33.9 10/19/2022 05:20 AM    RDW 12.3 10/19/2022 05:20 AM     10/19/2022 05:20 AM    MPV 9.9 10/19/2022 05:20 AM     CMP:    Lab Results   Component Value Date/Time     10/19/2022 05:20 AM    K 3.9 10/19/2022 05:20 AM     10/19/2022 05:20 AM    CO2 24 10/19/2022 05:20 AM    BUN 9 10/19/2022 05:20 AM    CREATININE 0.7 10/19/2022 05:20 AM    GFRAA >60 04/12/2020 05:49 AM    LABGLOM >60 10/19/2022 05:20 AM    GLUCOSE 106 10/19/2022 05:20 AM    PROT 6.0 10/19/2022 05:20 AM    LABALBU 3.6 10/19/2022 05:20 AM    CALCIUM 8.8 10/19/2022 05:20 AM    BILITOT 0.3 10/19/2022 05:20 AM    ALKPHOS 106 10/19/2022 05:20 AM    AST 12 10/19/2022 05:20 AM    ALT 11 10/19/2022 05:20 AM     PT/INR:    Lab Results   Component Value Date/Time    PROTIME 11.4 10/18/2022 10:40 PM    INR 1.1 10/18/2022 10:40 PM       MRI brain images reviewed: Small areas of old stroke in the left middle cerebral artery territory. Petechial hemorrhage within was felt by radiology to be old. CT angiogram of the head and neck showed no significant stenosis.       Electronically signed by [facility] on 10/19/2022 at 10:39 AM

## 2022-10-19 NOTE — ED NOTES
When entering room to give patient discharge papers, patient not in room. Attempted to call patient at both phone numbers listed, no answer. If patient left, patient did NOT leave with an IV, IV had been removed by this RN. Discharge papers given to charge nurse for if/when patient returns.       Teodoro Mena RN  10/19/22 [id number]

## 2022-10-19 NOTE — H&P
Hospitalist History & Physical      PCP: Drew Nails MD    Date of Service: Pt seen/examined on 10/19/2022     Chief Complaint:  had concerns including Altered Mental Status (Left sided flaccid. From [employer]. Had 3 teeth pulled at 9pm. Hx of stroke with right sided leg weakness. ). History Of Present Illness:    Ms. Phu Winkler, a 47y.o. year old female  who  has a past medical history of CVA (cerebral vascular accident) ([address] Hyperlipidemia, Morbid (severe) obesity due to excess calories ([address] and Pulmonary emphysema ([address] Patient presented to the emergency department with strokelike symptoms. Left arm is flaccid. Has a history of previous stroke with right-sided weakness. Vital signs within normal limits and stable. CT of the head and CTA head/neck/brain perfusion were all unremarkable. Patient's symptoms persist.  Medicine was consulted for admission. Past Medical History:   Diagnosis Date    CVA (cerebral vascular accident) ([address] 4/11/2020    Hyperlipidemia 4/12/2020    Morbid (severe) obesity due to excess calories [facility])     Pulmonary emphysema ([facility] 1/13/2019       Past Surgical History:   Procedure Laterality Date    CAROTID ENDARTERECTOMY Left 4/15/2020    CAROTID ENDARTERECTOMY performed by Maame Tijerina MD at [address]         Prior to Admission medications    Medication Sig Start Date End Date Taking? Authorizing Provider   aspirin 81 MG EC tablet Take 1 tablet by mouth daily  Patient not taking: Reported on 10/19/2022 4/17/20   Eleanor Varghese MD   atorvastatin (LIPITOR) 40 MG tablet Take 1 tablet by mouth nightly  Patient not taking: Reported on 10/19/2022 4/16/20   Eleanor Varghese MD         Allergies:  Patient has no known allergies. Social History:    TOBACCO:   reports that she has been smoking cigarettes. She has a 14.00 pack-year smoking history.  She has never used smokeless tobacco.  ETOH:   reports current alcohol use of about 1.0 standard drink per week. Family History:    Reviewed in detail and negative for DM, CAD, Cancer, CVA. Positive as follows\"      Problem Relation Age of Onset    Diabetes Mother     High Blood Pressure Mother     Other Mother        REVIEW OF SYSTEMS:   Pertinent positives as noted in the HPI. All other systems reviewed and negative. PHYSICAL EXAM:  BP (!) 141/85   Pulse 53   Temp 97.4 °F (36.3 °C)   Resp 20   LMP 05/19/2012   SpO2 97%   General appearance: No apparent distress, appears stated age and cooperative. HEENT: Normal cephalic, atraumatic without obvious deformity. Pupils equal, round, and reactive to light. Extra ocular muscles intact. Conjunctivae/corneas clear. Neck: Supple, with full range of motion. No jugular venous distention. Trachea midline. Respiratory: CTA  Cardiovascular: RRR  Abdomen: S/NT/ND  Musculoskeletal: No clubbing, cyanosis, edema of bilateral lower extremities. Brisk capillary refill. Skin: Normal skin color. No rashes or lesions. Neurologic:  Neurovascularly intact without any focal sensory/motor deficits. Cranial nerves: II-XII intact, grossly non-focal.  Left arm flaccid    Reviewed EKG and CXR personally      CBC:   Recent Labs     10/18/22  2240   WBC 7.7   RBC 5.13   HGB 15.5   HCT 45.4   MCV 88.5   RDW 12.3        BMP:   Recent Labs     10/18/22  2240      K 4.0      CO2 25   BUN 8   CREATININE 0.7     LFT:  No results for input(s): PROT, ALB, ALKPHOS, ALT, AST, BILITOT, AMYLASE, LIPASE in the last 72 hours. CE:  No results for input(s): Leon Tod in the last 72 hours. PT/INR:   Recent Labs     10/18/22  2240   INR 1.1   APTT 29.0     BNP: No results for input(s): BNP in the last 72 hours.   ESR: No results found for: SEDRATE  CRP: No results found for: CRP  D Dimer:   Lab Results   Component Value Date    DDIMER 236 01/13/2019      Folate and B12: No results found for: Alvin Esparza, No results found for: FOLATE  Lactic Acid:   Lab Results   Component Value Date    LACTA 1.3 2019     Thyroid Studies:   Lab Results   Component Value Date    TSH 1.250 2014       Oupatient labs:  Lab Results   Component Value Date    CHOL 198 2020    TRIG 192 (H) 2020    HDL 41 2020    LDLCALC 119 (H) 2020    TSH 1.250 2014    INR 1.1 10/18/2022    LABA1C 5.5 2020       Urinalysis:    Lab Results   Component Value Date/Time    NITRU Negative 2018 06:12 PM    WBCUA 0-1 2018 06:12 PM    BACTERIA RARE 2018 06:12 PM    RBCUA 0-1 2018 06:12 PM    RBCUA 1-3 08/15/2013 02:23 PM    BLOODU SMALL 2018 06:12 PM    SPECGRAV 1.010 2018 06:12 PM    GLUCOSEU Negative 2018 06:12 PM       Imaging:  CT HEAD WO CONTRAST    Result Date: 10/18/2022  Patient MRN:  [id number] : 1970 Age: 47 years Gender: Female Order Date:  10/18/2022 10:41 PM EXAM: CT HEAD WO CONTRAST NUMBER OF IMAGES:  18 INDICATION:  stroke alert stroke alert Decision Support Exception - unselect if not a suspected or confirmed emergency medical condition->Emergency Medical Condition (MA) What reading provider will be dictating this exam?->[facility] COMPARISON: None Technique: Low-dose CT  acquisition technique included one of following options; 1 . Automated exposure control, 2. Adjustment of MA and or KV according to patient's size or 3. Use of iterative reconstruction. Multiple CT sections were obtained with sagittal and coronal MPR reconstructions. The ventricles are unremarkable. The gyri and sulci appear unremarkable. The white matter appears unremarkable. There is no evidence for hemorrhage. There is no infarct identified. There is no mass effect identified. There is no mass identified. Unremarkable scan of the head.  Findings were called to Dr. Manjeet Bowens     XR CHEST PORTABLE    Result Date: 10/19/2022  EXAMINATION: ONE XRAY VIEW OF THE CHEST 10/18/2022 11:58 pm COMPARISON: 2019 HISTORY: ORDERING SYSTEM PROVIDED HISTORY: stroke TECHNOLOGIST PROVIDED HISTORY: Reason for exam:->stroke What reading provider will be dictating this exam?->[facility] FINDINGS: The lungs are without acute focal process. There is no effusion or pneumothorax. The cardiomediastinal silhouette is without acute process. The osseous structures are without acute process. No acute process. CTA NECK W CONTRAST    Result Date: 10/18/2022  Patient MRN:  [id number] : 1970 Age: 47 years Gender: Female Order Date:  10/18/2022 10:41 PM EXAM: CTA NECK W CONTRAST, CTA HEAD W CONTRAST NUMBER OF IMAGES:  65 INDICATION:  stroke alert stroke alert Decision Support Exception - unselect if not a suspected or confirmed emergency medical condition->Emergency Medical Condition (MA) What reading provider will be dictating this exam?->[facility] COMPARISON: None Technique: Low-dose CT  acquisition technique included one of following options; 1 . Automated exposure control, 2. Adjustment of MA and or KV according to patient's size or 3. Use of iterative reconstruction. Contiguous spiral images were obtained in the axial plane, following the administration of intravenous contrast using CT angiographic protocol. Sagittal and coronal images were reconstructed from the axial plane acquisition. Additional MIP reconstructions were presented to aid in the interpretation of this study. Images were obtained from the skull base cranially. There is moderate calcified plaque identified in the vessels compatible with atherosclerotic disease. The right carotid is abnormal. There is  evidence for hemodynamically significant stenosis at the level the proximal internal carotid artery.  By NASCET criteria estimated stenosis is 50-70% The left carotid is mildly atherosclerotic without significant stenosis The right vertebral artery is mildly atherosclerotic without significant stenosis The left vertebral artery is mildly atherosclerotic without significant stenosis The basilar artery is unremarkable The middle cerebral arteries are unremarkable The anterior cerebral arteries are unremarkable The posterior cerebral arteries are unremarkable     1. Estimated stenosis of the proximal right and left internal carotid artery by NASCET criteria is not hemodynamically significant 2. Moderate atherosclerotic disease . 3. No large vessel occlusion identified This study was analyzed by the MEPS Real-Time. ai algorithm. CT BRAIN PERFUSION    Result Date: 10/18/2022  Patient MRN: [id number] : 1970 Age:  47 years Gender: Female Order Date: 10/18/2022 10:41 PM Exam: CT BRAIN PERFUSION Number of Images: 033 views Indication:   stroke alert stroke alert Decision Support Exception - unselect if not a suspected or confirmed emergency medical condition->Emergency Medical Condition (MA) What reading provider will be dictating this exam?->[facility] Comparison: None. Findings: Perfusion images demonstrate symmetric blood volume Blood flow images demonstrate symmetric blood flow There is no significant ischemic penumbra identified. There is no significant core infarct identified. No significant ischemic penumbra identified This study was analyzed by the MEPS Real-Time. ai algorithm. CTA HEAD W CONTRAST    Result Date: 10/18/2022  Patient MRN:  [id number] : 1970 Age: 47 years Gender: Female Order Date:  10/18/2022 10:41 PM EXAM: CTA NECK W CONTRAST, CTA HEAD W CONTRAST NUMBER OF IMAGES:  65 INDICATION:  stroke alert stroke alert Decision Support Exception - unselect if not a suspected or confirmed emergency medical condition->Emergency Medical Condition (MA) What reading provider will be dictating this exam?->[facility] COMPARISON: None Technique: Low-dose CT  acquisition technique included one of following options; 1 . Automated exposure control, 2. Adjustment of MA and or KV according to patient's size or 3. Use of iterative reconstruction.  Contiguous spiral images were obtained in the axial plane, following the administration of intravenous contrast using CT angiographic protocol. Sagittal and coronal images were reconstructed from the axial plane acquisition. Additional MIP reconstructions were presented to aid in the interpretation of this study. Images were obtained from the skull base cranially. There is moderate calcified plaque identified in the vessels compatible with atherosclerotic disease. The right carotid is abnormal. There is  evidence for hemodynamically significant stenosis at the level the proximal internal carotid artery. By NASCET criteria estimated stenosis is 50-70% The left carotid is mildly atherosclerotic without significant stenosis The right vertebral artery is mildly atherosclerotic without significant stenosis The left vertebral artery is mildly atherosclerotic without significant stenosis The basilar artery is unremarkable The middle cerebral arteries are unremarkable The anterior cerebral arteries are unremarkable The posterior cerebral arteries are unremarkable     1. Estimated stenosis of the proximal right and left internal carotid artery by NASCET criteria is not hemodynamically significant 2. Moderate atherosclerotic disease . 3. No large vessel occlusion identified This study was analyzed by the V[employer]. ai algorithm.        ASSESSMENT:  -Strokelike symptoms  -Left arm flaccid  -Hyperlipidemia  -Hypertension  -History of previous CVA      PLAN:  -Admit to medicine  -Consult neurology  -MRI of the brain without contrast  -PT/OT  -Telemetry  -Aspirin, atorvastatin, Lovenox        Diet: No diet orders on file  Code Status: Prior  Surrogate decision maker confirmed with patient:   Extended Emergency Contact Information  Primary Emergency Contact: Annette Guzman  Address: [address] [place] [address] Phone: [phone]  Relation: Brother/Sister    DVT Prophylaxis: []Lovenox []Heparin []PCD [] [address] []Encouraged ambulation  Disposition: []Med/Surg [] Intermediate [] ICU/CCU  Admit status: [] Observation [] Inpatient     +++++++++++++++++++++++++++++++++++++++++++++++++  Alexis Neat, DO  +++++++++++++++++++++++++++++++++++++++++++++++++  NOTE: This report was transcribed using voice recognition software. Every effort was made to ensure accuracy; however, inadvertent computerized transcription errors may be present.

## 2023-02-04 ENCOUNTER — APPOINTMENT (OUTPATIENT)
Dept: CT IMAGING | Age: 53
End: 2023-02-04
Payer: COMMERCIAL

## 2023-02-04 ENCOUNTER — HOSPITAL ENCOUNTER (OUTPATIENT)
Age: 53
Setting detail: OBSERVATION
Discharge: HOME | End: 2023-02-06
Attending: EMERGENCY MEDICINE | Admitting: FAMILY MEDICINE
Payer: COMMERCIAL

## 2023-02-04 DIAGNOSIS — I63.9 CEREBROVASCULAR ACCIDENT (CVA), UNSPECIFIED MECHANISM (HCC): Primary | ICD-10-CM

## 2023-02-04 LAB
BASOPHILS ABSOLUTE: 0.03 E9/L (ref 0–0.2)
BASOPHILS RELATIVE PERCENT: 0.5 % (ref 0–2)
CHP ED QC CHECK: YES
EOSINOPHILS ABSOLUTE: 0.11 E9/L (ref 0.05–0.5)
EOSINOPHILS RELATIVE PERCENT: 1.8 % (ref 0–6)
GLUCOSE BLD-MCNC: 93 MG/DL
HCT VFR BLD CALC: 41.6 % (ref 34–48)
HEMOGLOBIN: 13.6 G/DL (ref 11.5–15.5)
IMMATURE GRANULOCYTES #: 0.02 E9/L
IMMATURE GRANULOCYTES %: 0.3 % (ref 0–5)
LYMPHOCYTES ABSOLUTE: 2.78 E9/L (ref 1.5–4)
LYMPHOCYTES RELATIVE PERCENT: 45.1 % (ref 20–42)
MCH RBC QN AUTO: 29.8 PG (ref 26–35)
MCHC RBC AUTO-ENTMCNC: 32.7 % (ref 32–34.5)
MCV RBC AUTO: 91 FL (ref 80–99.9)
METER GLUCOSE: 93 MG/DL (ref 74–99)
MONOCYTES ABSOLUTE: 0.34 E9/L (ref 0.1–0.95)
MONOCYTES RELATIVE PERCENT: 5.5 % (ref 2–12)
NEUTROPHILS ABSOLUTE: 2.88 E9/L (ref 1.8–7.3)
NEUTROPHILS RELATIVE PERCENT: 46.8 % (ref 43–80)
PDW BLD-RTO: 11.9 FL (ref 11.5–15)
PLATELET # BLD: 259 E9/L (ref 130–450)
PMV BLD AUTO: 10.5 FL (ref 7–12)
RBC # BLD: 4.57 E12/L (ref 3.5–5.5)
WBC # BLD: 6.2 E9/L (ref 4.5–11.5)

## 2023-02-04 PROCEDURE — 80053 COMPREHEN METABOLIC PANEL: CPT

## 2023-02-04 PROCEDURE — 85025 COMPLETE CBC W/AUTO DIFF WBC: CPT

## 2023-02-04 PROCEDURE — 2580000003 HC RX 258: Performed by: STUDENT IN AN ORGANIZED HEALTH CARE EDUCATION/TRAINING PROGRAM

## 2023-02-04 PROCEDURE — 84484 ASSAY OF TROPONIN QUANT: CPT

## 2023-02-04 PROCEDURE — 82962 GLUCOSE BLOOD TEST: CPT

## 2023-02-04 PROCEDURE — 36415 COLL VENOUS BLD VENIPUNCTURE: CPT

## 2023-02-04 PROCEDURE — 83036 HEMOGLOBIN GLYCOSYLATED A1C: CPT

## 2023-02-04 PROCEDURE — 99285 EMERGENCY DEPT VISIT HI MDM: CPT

## 2023-02-04 PROCEDURE — 93005 ELECTROCARDIOGRAM TRACING: CPT | Performed by: STUDENT IN AN ORGANIZED HEALTH CARE EDUCATION/TRAINING PROGRAM

## 2023-02-04 RX ORDER — 0.9 % SODIUM CHLORIDE 0.9 %
1000 INTRAVENOUS SOLUTION INTRAVENOUS ONCE
Status: COMPLETED | OUTPATIENT
Start: 2023-02-04 | End: 2023-02-05

## 2023-02-04 RX ADMIN — SODIUM CHLORIDE 1000 ML: 9 INJECTION, SOLUTION INTRAVENOUS at 23:55

## 2023-02-05 ENCOUNTER — APPOINTMENT (OUTPATIENT)
Dept: MRI IMAGING | Age: 53
End: 2023-02-05
Payer: COMMERCIAL

## 2023-02-05 ENCOUNTER — APPOINTMENT (OUTPATIENT)
Dept: CT IMAGING | Age: 53
End: 2023-02-05
Payer: COMMERCIAL

## 2023-02-05 ENCOUNTER — APPOINTMENT (OUTPATIENT)
Dept: ULTRASOUND IMAGING | Age: 53
End: 2023-02-05
Payer: COMMERCIAL

## 2023-02-05 ENCOUNTER — APPOINTMENT (OUTPATIENT)
Dept: GENERAL RADIOLOGY | Age: 53
End: 2023-02-05
Payer: COMMERCIAL

## 2023-02-05 PROBLEM — R29.90 STROKE-LIKE SYMPTOM: Status: ACTIVE | Noted: 2023-02-05

## 2023-02-05 PROBLEM — I63.531 CEREBROVASCULAR ACCIDENT (CVA) DUE TO OCCLUSION OF RIGHT POSTERIOR CEREBRAL ARTERY (HCC): Status: ACTIVE | Noted: 2023-02-05

## 2023-02-05 PROBLEM — R00.1 SINUS BRADYCARDIA: Status: ACTIVE | Noted: 2023-02-05

## 2023-02-05 PROBLEM — R29.90 STROKE-LIKE SYMPTOM: Status: RESOLVED | Noted: 2023-02-05 | Resolved: 2023-02-05

## 2023-02-05 PROBLEM — R29.90 STROKE-LIKE SYMPTOMS: Status: RESOLVED | Noted: 2022-10-19 | Resolved: 2023-02-05

## 2023-02-05 PROBLEM — I63.9 ACUTE CVA (CEREBROVASCULAR ACCIDENT) (HCC): Status: RESOLVED | Noted: 2020-04-11 | Resolved: 2023-02-05

## 2023-02-05 PROBLEM — E66.01 MORBID OBESITY WITH BMI OF 40.0-44.9, ADULT (HCC): Status: ACTIVE | Noted: 2020-04-12

## 2023-02-05 PROBLEM — I63.9 STROKE (CEREBRUM) (HCC): Status: ACTIVE | Noted: 2023-02-05

## 2023-02-05 LAB
ALBUMIN SERPL-MCNC: 3.9 G/DL (ref 3.5–5.2)
ALP BLD-CCNC: 89 U/L (ref 35–104)
ALT SERPL-CCNC: 14 U/L (ref 0–32)
ANION GAP SERPL CALCULATED.3IONS-SCNC: 9 MMOL/L (ref 7–16)
AST SERPL-CCNC: 15 U/L (ref 0–31)
BILIRUB SERPL-MCNC: 0.3 MG/DL (ref 0–1.2)
BUN BLDV-MCNC: 8 MG/DL (ref 6–20)
CALCIUM SERPL-MCNC: 8.9 MG/DL (ref 8.6–10.2)
CHLORIDE BLD-SCNC: 103 MMOL/L (ref 98–107)
CHOLESTEROL, TOTAL: 135 MG/DL (ref 0–199)
CO2: 25 MMOL/L (ref 22–29)
CREAT SERPL-MCNC: 0.6 MG/DL (ref 0.5–1)
EKG ATRIAL RATE: 49 BPM
EKG P AXIS: 44 DEGREES
EKG P-R INTERVAL: 148 MS
EKG Q-T INTERVAL: 416 MS
EKG QRS DURATION: 82 MS
EKG QTC CALCULATION (BAZETT): 375 MS
EKG R AXIS: 59 DEGREES
EKG T AXIS: 60 DEGREES
EKG VENTRICULAR RATE: 49 BPM
GFR SERPL CREATININE-BSD FRML MDRD: >60 ML/MIN/1.73
GLUCOSE BLD-MCNC: 98 MG/DL (ref 74–99)
HBA1C MFR BLD: 5.4 % (ref 4–5.6)
HDLC SERPL-MCNC: 46 MG/DL
LDL CHOLESTEROL CALCULATED: 80 MG/DL (ref 0–99)
POTASSIUM REFLEX MAGNESIUM: 3.9 MMOL/L (ref 3.5–5)
SODIUM BLD-SCNC: 137 MMOL/L (ref 132–146)
TOTAL PROTEIN: 6.5 G/DL (ref 6.4–8.3)
TRIGL SERPL-MCNC: 45 MG/DL (ref 0–149)
TROPONIN, HIGH SENSITIVITY: <6 NG/L (ref 0–9)
TROPONIN, HIGH SENSITIVITY: <6 NG/L (ref 0–9)
VLDLC SERPL CALC-MCNC: 9 MG/DL

## 2023-02-05 PROCEDURE — 6360000004 HC RX CONTRAST MEDICATION: Performed by: RADIOLOGY

## 2023-02-05 PROCEDURE — G0378 HOSPITAL OBSERVATION PER HR: HCPCS

## 2023-02-05 PROCEDURE — 36415 COLL VENOUS BLD VENIPUNCTURE: CPT

## 2023-02-05 PROCEDURE — 70498 CT ANGIOGRAPHY NECK: CPT

## 2023-02-05 PROCEDURE — 70450 CT HEAD/BRAIN W/O DYE: CPT

## 2023-02-05 PROCEDURE — 70544 MR ANGIOGRAPHY HEAD W/O DYE: CPT

## 2023-02-05 PROCEDURE — 0042T CT BRAIN PERFUSION: CPT

## 2023-02-05 PROCEDURE — 71045 X-RAY EXAM CHEST 1 VIEW: CPT

## 2023-02-05 PROCEDURE — 99222 1ST HOSP IP/OBS MODERATE 55: CPT | Performed by: FAMILY MEDICINE

## 2023-02-05 PROCEDURE — 6360000002 HC RX W HCPCS: Performed by: FAMILY MEDICINE

## 2023-02-05 PROCEDURE — 84484 ASSAY OF TROPONIN QUANT: CPT

## 2023-02-05 PROCEDURE — 70551 MRI BRAIN STEM W/O DYE: CPT

## 2023-02-05 PROCEDURE — 70547 MR ANGIOGRAPHY NECK W/O DYE: CPT

## 2023-02-05 PROCEDURE — 93010 ELECTROCARDIOGRAM REPORT: CPT | Performed by: INTERNAL MEDICINE

## 2023-02-05 PROCEDURE — 6370000000 HC RX 637 (ALT 250 FOR IP): Performed by: INTERNAL MEDICINE

## 2023-02-05 PROCEDURE — 70496 CT ANGIOGRAPHY HEAD: CPT

## 2023-02-05 PROCEDURE — 93880 EXTRACRANIAL BILAT STUDY: CPT

## 2023-02-05 PROCEDURE — 80061 LIPID PANEL: CPT

## 2023-02-05 PROCEDURE — 6370000000 HC RX 637 (ALT 250 FOR IP): Performed by: FAMILY MEDICINE

## 2023-02-05 RX ORDER — CLOPIDOGREL BISULFATE 75 MG/1
75 TABLET ORAL DAILY
Status: DISCONTINUED | OUTPATIENT
Start: 2023-02-05 | End: 2023-02-06 | Stop reason: HOSPADM

## 2023-02-05 RX ORDER — IPRATROPIUM BROMIDE AND ALBUTEROL SULFATE 2.5; .5 MG/3ML; MG/3ML
1 SOLUTION RESPIRATORY (INHALATION) EVERY 4 HOURS PRN
Status: DISCONTINUED | OUTPATIENT
Start: 2023-02-05 | End: 2023-02-06 | Stop reason: HOSPADM

## 2023-02-05 RX ORDER — LORAZEPAM 0.5 MG/1
0.5 TABLET ORAL EVERY 4 HOURS PRN
Status: DISCONTINUED | OUTPATIENT
Start: 2023-02-05 | End: 2023-02-06 | Stop reason: HOSPADM

## 2023-02-05 RX ORDER — ENOXAPARIN SODIUM 100 MG/ML
30 INJECTION SUBCUTANEOUS 2 TIMES DAILY
Status: DISCONTINUED | OUTPATIENT
Start: 2023-02-05 | End: 2023-02-05

## 2023-02-05 RX ORDER — ENOXAPARIN SODIUM 100 MG/ML
30 INJECTION SUBCUTANEOUS 2 TIMES DAILY
Status: DISCONTINUED | OUTPATIENT
Start: 2023-02-05 | End: 2023-02-06 | Stop reason: HOSPADM

## 2023-02-05 RX ORDER — ASPIRIN 81 MG/1
81 TABLET ORAL DAILY
Status: DISCONTINUED | OUTPATIENT
Start: 2023-02-05 | End: 2023-02-06 | Stop reason: HOSPADM

## 2023-02-05 RX ORDER — ATORVASTATIN CALCIUM 40 MG/1
80 TABLET, FILM COATED ORAL NIGHTLY
Status: DISCONTINUED | OUTPATIENT
Start: 2023-02-05 | End: 2023-02-06 | Stop reason: HOSPADM

## 2023-02-05 RX ADMIN — ENOXAPARIN SODIUM 30 MG: 100 INJECTION SUBCUTANEOUS at 04:25

## 2023-02-05 RX ADMIN — LORAZEPAM 0.5 MG: 0.5 TABLET ORAL at 14:13

## 2023-02-05 RX ADMIN — CLOPIDOGREL BISULFATE 75 MG: 75 TABLET ORAL at 10:35

## 2023-02-05 RX ADMIN — ATORVASTATIN CALCIUM 80 MG: 40 TABLET, FILM COATED ORAL at 20:02

## 2023-02-05 RX ADMIN — ASPIRIN 81 MG: 81 TABLET, COATED ORAL at 10:35

## 2023-02-05 RX ADMIN — LORAZEPAM 0.5 MG: 0.5 TABLET ORAL at 20:36

## 2023-02-05 RX ADMIN — IOPAMIDOL 150 ML: 755 INJECTION, SOLUTION INTRAVENOUS at 01:18

## 2023-02-05 RX ADMIN — ENOXAPARIN SODIUM 30 MG: 100 INJECTION SUBCUTANEOUS at 18:52

## 2023-02-05 ASSESSMENT — PAIN SCALES - GENERAL: PAINLEVEL_OUTOF10: 0

## 2023-02-05 ASSESSMENT — LIFESTYLE VARIABLES
HOW MANY STANDARD DRINKS CONTAINING ALCOHOL DO YOU HAVE ON A TYPICAL DAY: PATIENT DOES NOT DRINK
HOW OFTEN DO YOU HAVE A DRINK CONTAINING ALCOHOL: NEVER

## 2023-02-05 NOTE — PROGRESS NOTES
Patient remains in forensic restraints  and currently is off of unit for MRI testing.   is with patient . Forensic devices remains on  rt wrist and bilateral ankles.

## 2023-02-05 NOTE — ED PROVIDER NOTES
Department of Emergency Medicine   ED  Provider Note  Admit Date/RoomTime: 2/4/2023 11:28 PM  ED Room: 9617/5847-12        2/4/23  11:39 PM EST    Stroke Alert called: [id number]    HISTORY OF PRESENT ILLNESS:  (Nurses Notes Reviewed)    Chief Complaint:   Tingling and Other (Pt had stroke before that affected left side. Pt states her left extremities feel tingly)      Source of history provided by:  patient and law enforcement. History/Exam Limitations: due to condition. Bry Parnell is a 47 y.o. old female presenting to the emergency department by EMS, with gradual onset of left sided weakness and numbness, which began at approximately 1700. Last known well time: 1700. The episode occurred at [facility]. Since recognized the symptoms have been persistent. She has history of stroke. She has stroke risk factors of: hyperlipidemia and smoking. There have been associated symptoms of none. There has been no history of recent trauma. Code Status on file: Full Code. NIH Stroke Scale at time of initial evaluation:   NIH Stroke Scale/Score at time of initial evaluation:  1A: Level of Consciousness 0 - alert; keenly responsive   1B: Ask Month and Age 0 - answers both questions correctly   1C:  Tell Patient To Open and Close Eyes, then Hand  Squeeze 0 - performs both tasks correctly   2: Test Horizontal Extraocular Movements 0 - normal   3: Test Visual Fields 1 - partial hemianopia   4: Test Facial Palsy 1 - minor paralysis (flattened nasolabial fold, asymmetric on smiling)   5A: Test Left Arm Motor Drift 1 - drift, limb holds 90 (or 45) degrees but drifts down before full 10 seconds: does not hit bed   5B: Test Right Arm Motor Drift 0 - no drift, limb holds 90 (or 45) degrees for full 10 seconds   6A: Test Left Leg Motor Drift 3 - no effort against gravity; leg falls to bed immediately   6B: Test Right Leg Motor Drift 0 - no drift; leg holds 30 degree position for full 5 seconds   7: Test Limb Ataxia (FNF/Heel-Shin) 0 - absent   8: Test Sensation 2 - severe to total sensory loss; patient is not aware of being touched in face, arm, leg   9: Test Language/Aphasia 0 - no aphasia, normal   10: Test Dysarthria 0 - normal   11: Test Extinction/Inattention 0 - no abnormality   Total 8           Past Medical History:  has a past medical history of CVA (cerebral vascular accident) ([facility] [address] Hyperlipidemia, Morbid (severe) obesity due to excess calories ([facility] [address] and Pulmonary emphysema ([facility] [address] Past Surgical History:  has a past surgical history that includes Cholecystectomy;  section; Tubal ligation; Tubal ligation; and Carotid endarterectomy (Left, 4/15/2020). Social History:  reports that she has been smoking cigarettes. She has a 14.00 pack-year smoking history. She has never used smokeless tobacco. She reports current alcohol use of about 1.0 standard drink per week. She reports that she does not use drugs. Prior Functional Status(Modified [place] Scale): 3=Moderate disability; requiring some help, but able to walk without assistance    Family History: family history includes Diabetes in her mother; High Blood Pressure in her mother; Other in her mother. The patients home medications have been reviewed. Prior to Admission medications    Medication Sig Start Date End Date Taking? Authorizing Provider   clopidogrel (PLAVIX) 75 MG tablet Take 1 tablet by mouth daily  Patient not taking: Reported on 2023 10/20/22   MANUEL Santiago - CNP   aspirin 81 MG EC tablet Take 1 tablet by mouth daily  Patient not taking: Reported on 2023   Vivek Montague MD   atorvastatin (LIPITOR) 40 MG tablet Take 1 tablet by mouth nightly  Patient not taking: Reported on 2023   Vivek Montague MD       Allergies: Patient has no allergy information on record. Review of Systems:   Pertinent positives and negatives are stated within HPI, all other systems reviewed and are negative.   Review of Systems   All other systems reviewed and are negative.     ---------------------------------------------------PHYSICAL EXAM--------------------------------------    Constitutional/General: Alert and oriented x3, well appearing, non toxic in NAD  Head: Normocephalic and atraumatic  Eyes: PERRL, EOMI  Mouth: Oropharynx clear, handling secretions, no trismus. positive facial droop on left  Neck: Supple, full ROM, non tender to palpation in the midline, no stridor, no crepitus, no meningeal signs  Pulmonary: Lungs clear to auscultation bilaterally, no wheezes, rales, or rhonchi. Not in respiratory distress  Cardiovascular:  Regular rate. Regular rhythm. No murmurs, gallops, or rubs. 2+ distal pulses  Chest: no chest wall tenderness  Abdomen: Soft. Non tender. Non distended. No rebound, guarding, or rigidity. No pulsatile masses appreciated. Musculoskeletal: Moves all extremities x 4. Warm and well perfused, no clubbing, cyanosis, or edema. Capillary refill <3 seconds  Skin: warm and dry. No rashes. Neurologic: GCS 15, no focal deficits, symmetric strength 5/5 in the upper and lower extremities bilaterally. Speech appropriate. Normal finger to nose. Positive drift. Please also see NIH stroke scale.   Psych: Normal Affect      -------------------------------------------------- RESULTS -------------------------------------------------  All laboratory and imaging studies have been reviewed by myself    LABS:  Results for orders placed or performed during the hospital encounter of 02/04/23   CBC with Auto Differential   Result Value Ref Range    WBC 6.2 4.5 - 11.5 E9/L    RBC 4.57 3.50 - 5.50 E12/L    Hemoglobin 13.6 11.5 - 15.5 g/dL    Hematocrit 41.6 34.0 - 48.0 %    MCV 91.0 80.0 - 99.9 fL    MCH 29.8 26.0 - 35.0 pg    MCHC 32.7 32.0 - 34.5 %    RDW 11.9 11.5 - 15.0 fL    Platelets 627 982 - 996 E9/L    MPV 10.5 7.0 - 12.0 fL    Neutrophils % 46.8 43.0 - 80.0 %    Immature Granulocytes % 0.3 0.0 - 5.0 % Lymphocytes % 45.1 (H) 20.0 - 42.0 %    Monocytes % 5.5 2.0 - 12.0 %    Eosinophils % 1.8 0.0 - 6.0 %    Basophils % 0.5 0.0 - 2.0 %    Neutrophils Absolute 2.88 1.80 - 7.30 E9/L    Immature Granulocytes # 0.02 E9/L    Lymphocytes Absolute 2.78 1.50 - 4.00 E9/L    Monocytes Absolute 0.34 0.10 - 0.95 E9/L    Eosinophils Absolute 0.11 0.05 - 0.50 E9/L    Basophils Absolute 0.03 0.00 - 0.20 E9/L   Comprehensive Metabolic Panel w/ Reflex to MG   Result Value Ref Range    Sodium 137 132 - 146 mmol/L    Potassium reflex Magnesium 3.9 3.5 - 5.0 mmol/L    Chloride 103 98 - 107 mmol/L    CO2 25 22 - 29 mmol/L    Anion Gap 9 7 - 16 mmol/L    Glucose 98 74 - 99 mg/dL    BUN 8 6 - 20 mg/dL    Creatinine 0.6 0.5 - 1.0 mg/dL    Est, Glom Filt Rate >60 >=60 mL/min/1.73    Calcium 8.9 8.6 - 10.2 mg/dL    Total Protein 6.5 6.4 - 8.3 g/dL    Albumin 3.9 3.5 - 5.2 g/dL    Total Bilirubin 0.3 0.0 - 1.2 mg/dL    Alkaline Phosphatase 89 35 - 104 U/L    ALT 14 0 - 32 U/L    AST 15 0 - 31 U/L   Troponin   Result Value Ref Range    Troponin, High Sensitivity <6 0 - 9 ng/L   Troponin   Result Value Ref Range    Troponin, High Sensitivity <6 0 - 9 ng/L   POCT Glucose   Result Value Ref Range    Glucose 93 mg/dL    QC OK? yes    POCT Glucose   Result Value Ref Range    Meter Glucose 93 74 - 99 mg/dL   EKG 12 Lead   Result Value Ref Range    Ventricular Rate 49 BPM    Atrial Rate 49 BPM    P-R Interval 148 ms    QRS Duration 82 ms    Q-T Interval 416 ms    QTc Calculation (Bazett) 375 ms    P Axis 44 degrees    R Axis 59 degrees    T Axis 60 degrees       RADIOLOGY:  Interpreted by Radiologist.  CT HEAD WO CONTRAST   Final Result   Addendum (preliminary) 1 of 1   ADDENDUM:   Preliminary findings were discussed with Dr. Agnieszka Espana on  02/05/2023 at   2:20 a.m. EST. Final   No acute intracranial abnormality.             CTA HEAD W CONTRAST   Preliminary Result   No definable compromise in the intracranial anterior circulation of bilateral internal carotid arteries. No definable compromise in the vertebrobasilar arterial circulation. No evidence of dural sinus thrombosis. In visualized brain there is no definable focal abnormality or acute process   as on the CTA images. On the CT perfusion images, there is perfusion mismatch volume of 2 cc in the   area of the right middle cerebellar peduncle and anteroinferior portion of   right temporal lobe. These are subtle findings and of doubtful significance. But further evaluation with MRI of brain suggested. CTA NECK W CONTRAST   Preliminary Result   Pre-existing 50% stenosis in the proximal portion right internal carotid   artery, about 1.5 cm superior to the origin, as also noted on previous CTA on   10/18/2022. There is no evidence of any calcified plaque at this level of   stenosis. In the rest of the cervical segment of the right ICA there is no   additional stenosis or plaque. No stenosis in the left internal carotid artery. No evidence of stenosis or compromise in bilateral vertebral arteries in the   neck. Some locules of air in the right cavernous sinus area is likely to be due to   injection of some air with the contrast and tracking of contrast through the   right internal jugular vein and skull base venous structures, through the   level of right cavernous sinus. This finding is likely to be benign. CT BRAIN PERFUSION   Final Result   Addendum (preliminary) 1 of 1   ADDENDUM:   Comparison to CT perfusion, 10/18/2022: This hypoperfusion defects in the   posterior temporal and cerebellar peduncle represent a new interval    change,   thus remains suspicious for a hyperacute ischemic event without    significant   penumbra. Preliminary findings were discussed with Star George MD.         Final   1.  There is pattern of hyperacute infarction in the right temporal lobe and   right cerebellar peduncle which corresponds with matched defects in posterior   cerebral artery and anterior inferior cerebellar artery distributions. 2. There is presumed iatrogenic air in the right carotid siphon and right   cavernous sinus. 3. Unremarkable CTA of the head. XR CHEST PORTABLE   Final Result   No acute process. Vascular carotid duplex bilateral    (Results Pending)   MRI brain without contrast    (Results Pending)   MRA head without contrast    (Results Pending)   MRA neck without contrast    (Results Pending)         EKG:  EKG shows sinus bradycardia at a ventricular rate of 49 bpm.  Normal axis. Does not meet STEMI criteria. Comparable to previous EKG on 10/18/2022. As interpreted by myself the ED physician        ------------------------- NURSING NOTES AND VITALS REVIEWED ---------------------------   The nursing notes within the ED encounter and vital signs as below have been reviewed. BP (!) 144/66   Pulse 60   Temp 98 °F (36.7 °C) (Oral)   Resp 16   Ht 5' 6\" (1.676 m)   Wt 255 lb (115.7 kg)   LMP 05/19/2012   SpO2 99%   BMI 41.16 kg/m²   Oxygen Saturation Interpretation: Normal    The patients available past medical records and past encounters were reviewed.         ------------------------------ ED COURSE/MEDICAL DECISION MAKING----------------------  Medications   enoxaparin Sodium (LOVENOX) injection 30 mg (has no administration in time range)   perflutren lipid microspheres (DEFINITY) injection 1.5 mL (has no administration in time range)   atorvastatin (LIPITOR) tablet 80 mg (has no administration in time range)   clopidogrel (PLAVIX) tablet 75 mg (has no administration in time range)   aspirin EC tablet 81 mg (has no administration in time range)   ipratropium-albuterol (DUONEB) nebulizer solution 1 ampule (has no administration in time range)   0.9 % sodium chloride bolus (0 mLs IntraVENous Stopped 2/5/23 5135)   iopamidol (ISOVUE-370) 76 % injection 150 mL (150 mLs IntraVENous Given 2/5/23 0118)       Based upon patient's stroke like symptoms a stroke neurology consult is indicated. Consult to Neurology completed. [facility] Neurology via Telepresence      Acute CVA Core Measures:      NIH Stroke Scale Total: Total: 14  t-PA Eligibility: was not recommeded by [employer] and under the order of the ED physician  IV t-PA was considered and not given due to violations in inclusion criteria including stroke onset was greater than 3 hours prior to presentation    Medical Decision Making:      ED Course as of 02/05/23 0330   Sun Feb 05, 2023   0007 Patient presents with strokelike symptoms. Onset of symptoms was greater than 4 and half hours. Sharifa alert called. Fluids started. Patient is already on aspirin and Plavix. Point-of-care glucose within normal limits EKG did not meet STEMI criteria. Troponins negative. Low concern for ACS. CBC did not show any leukocytosis or anemia. CMP was without any electrolyte abnormalities. CTAs and CT brain perfusion were obtained which did show a small mismatch perfusion in the peduncle region. Patient however did not have any large vessel occlusion. Patient is also outside of the timeframe for TNK. TNK not given. Given patient's findings and symptoms, patient will be admitted for MRI and further evaluation and care. [BB]   0120 Spoke with Dr. Guanako Starks who does not see any obvious stroke. He recommends continuing with aspirin and further workup. [BB]   [id number] Spoke with Dr. Lily Choi who will admit the patient. [BB]      ED Course User Index  [BB] Mami Neumann,          Re-Evaluations:             Re-evaluation. Patients symptoms show no change    This patient's ED course included: a personal history and physicial examination, multiple bedside re-evaluations, IV medications, cardiac monitoring, and a personal history and physicial eaxmination    This patient has remained hemodynamically stable during their ED course.     Consultations:             Neurology  The case has been discussed with Dr. Rc Chase, they agree this patient is NOT t-PA eligible. The CT of the head without contrast has been discussed with radiology. He has advised the patient is not a candidate for intervention. Dr. Yvon Puckett they will admit the patient      Critical Care:   Please note that the withdrawal or failure to initiate urgent interventions for this patient would likely result in a life threatening deterioration or permanent disability. Systems at risk for deterioration include: Neurologic    Accordingly this patient received 36 minutes of critical care time, excluding separately billable procedures. Counseling: The emergency provider has spoken with the patient and discussed todays presentation, in addition to providing specific details for the plan of care and counseling regarding the diagnosis and prognosis. Questions are answered at this time and they are agreeable with the plan. Was IV tPa Delay/Not Given: Yes    IV tPA not given because:  MD recommended another approach. Medical management     --------------------------------- IMPRESSION AND DISPOSITION ---------------------------------    IMPRESSION  1. Cerebrovascular accident (CVA), unspecified mechanism ([facility] [address]        DISPOSITION  Disposition: Admit to telemetry  Patient condition is stable    Patient was seen and evaluated by both myself and [facility] . Please note that the above documentation was prepared using voice recognition software. Attempt was made to ensure accuracy but there may be spelling, grammatical, and contextual errors.          Deanne Silveira DO  Resident  02/05/23 1570

## 2023-02-05 NOTE — PROGRESS NOTES
[address] [place] [address] Hospitalist   Progress Note    Admitting Date and Time: 2/4/2023 11:28 PM  Admit Dx: Stroke-like symptom [R29.90]    Subjective/interval history:    Pt was admitted last night with probable right hemispheric stroke. She has had 2 strokes in the past.  She is currently incarcerated. She noticed left-sided weakness, numbness, and headache. She still has left-sided weakness at this time. CT of the head with perfusion in the ED did suggest area of hyperacute infarction. MRI brain was done and does not show acute infarct, though it is possible it is too early to show up on MRI imaging. MRAs with right internal carotid stenosis, about 50%. Carotid ultrasound with 50 to 69% stenosis on the right. She was started on dual antiplatelet therapy and high intensity statin. She has been normotensive. ROS: denies fever, chills, cp, sob, n/v, HA unless stated above.      atorvastatin  80 mg Oral Nightly    clopidogrel  75 mg Oral Daily    aspirin  81 mg Oral Daily    enoxaparin  30 mg SubCUTAneous BID     perflutren lipid microspheres, 1.5 mL, ONCE PRN  ipratropium-albuterol, 1 ampule, Q4H PRN  LORazepam, 0.5 mg, Q4H PRN       Objective:    /62   Pulse 56   Temp 97.9 °F (36.6 °C) (Oral)   Resp 16   Ht 5' 6\" (1.676 m)   Wt 255 lb (115.7 kg)   LMP 05/19/2012   SpO2 97%   BMI 41.16 kg/m²   General Appearance: alert and oriented to person, place and time and in no acute distress  Skin: warm and dry  Head: normocephalic and atraumatic  Eyes: pupils equal, round, and reactive to light, extraocular eye movements intact, conjunctivae normal  Neck: neck supple and non tender without mass   Pulmonary/Chest: clear to auscultation bilaterally- no wheezes, rales or rhonchi, normal air movement, no respiratory distress  Cardiovascular: normal rate, normal S1 and S2 and no carotid bruits  Abdomen: soft, non-tender, non-distended, normal bowel sounds, no masses or organomegaly  Extremities: no cyanosis, no clubbing and no edema  Neurologic: Diminished sensation on the left side including face, otherwise cranial nerves intact. Right-sided sensation intact. Muscle strength on the left 3-4/5 upper and lower extremities. Recent Labs     02/04/23  2340 02/04/23  2343     --    K 3.9  --      --    CO2 25  --    BUN 8  --    CREATININE 0.6  --    GLUCOSE 98 93   CALCIUM 8.9  --        Recent Labs     02/04/23  2340   ALKPHOS 89   PROT 6.5   LABALBU 3.9   BILITOT 0.3   AST 15   ALT 14       Recent Labs     02/04/23  2340   WBC 6.2   RBC 4.57   HGB 13.6   HCT 41.6   MCV 91.0   MCH 29.8   MCHC 32.7   RDW 11.9      MPV 10.5       Radiology:   US CAROTID ARTERY BILATERAL   Final Result   The right internal carotid artery demonstrates 50-69% stenosis. The left internal carotid artery demonstrates 0-50% stenosis. Bilateral vertebral arteries are patent with flow in the normal direction. MRA neck without contrast   Final Result   1. No acute intracranial abnormality. No acute infarct. 2. Tiny chronic infarcts again seen involving the left posterior frontal and   left parietal lobes. 3. Minimal chronic microvascular ischemic changes. 4. MRA of the head and neck demonstrate no significant change compared to the   CTA performed 6 hours prior. 5. Approximately 50% stenosis of the proximal right cervical ICA by NASCET   criteria. MRA head without contrast   Final Result   1. No acute intracranial abnormality. No acute infarct. 2. Tiny chronic infarcts again seen involving the left posterior frontal and   left parietal lobes. 3. Minimal chronic microvascular ischemic changes. 4. MRA of the head and neck demonstrate no significant change compared to the   CTA performed 6 hours prior. 5. Approximately 50% stenosis of the proximal right cervical ICA by NASCET   criteria. MRI brain without contrast   Final Result   1. No acute intracranial abnormality. No acute infarct. 2. Tiny chronic infarcts again seen involving the left posterior frontal and   left parietal lobes. 3. Minimal chronic microvascular ischemic changes. 4. MRA of the head and neck demonstrate no significant change compared to the   CTA performed 6 hours prior. 5. Approximately 50% stenosis of the proximal right cervical ICA by NASCET   criteria. CT HEAD WO CONTRAST   Final Result   Addendum (preliminary) 1 of 1   ADDENDUM:   Preliminary findings were discussed with Dr. Siria Erwin on  02/05/2023 at   2:20 a.m. EST. Final   No acute intracranial abnormality. CTA HEAD W CONTRAST   Final Result   No definable compromise in the intracranial anterior circulation of bilateral   internal carotid arteries. No definable compromise in the vertebrobasilar arterial circulation. No evidence of dural sinus thrombosis. In visualized brain there is no definable focal abnormality or acute process   as on the CTA images. On the CT perfusion images, there is perfusion mismatch volume of 2 cc in the   area of the right middle cerebellar peduncle and anteroinferior portion of   right temporal lobe. These are subtle findings and of doubtful significance. But further evaluation with MRI of brain suggested. CTA NECK W CONTRAST   Final Result   Pre-existing 50% stenosis in the proximal portion of the right internal   carotid artery, about 1.5 cm superior to the origin, as also noted on   previous CTA on 10/18/2022. There is no evidence of any calcified plaque at   this level of stenosis. In the rest of the cervical segment of the right ICA   there is no additional stenosis or plaque. No stenosis in the left internal carotid artery. No evidence of stenosis or compromise in bilateral vertebral arteries in the   neck.       Some locules of air in the right cavernous sinus area is likely to be due to   injection of some air with the contrast and tracking of contrast through the   right internal jugular vein and skull base venous structures, through the   level of right cavernous sinus. This finding is likely to be benign. CT BRAIN PERFUSION   Final Result   Addendum (preliminary) 1 of 1   ADDENDUM:   Comparison to CT perfusion, 10/18/2022: This hypoperfusion defects in the   posterior temporal and cerebellar peduncle represent a new interval    change,   thus remains suspicious for a hyperacute ischemic event without    significant   penumbra. Preliminary findings were discussed with Chacha Calero MD.         Final   1. There is pattern of hyperacute infarction in the right temporal lobe and   right cerebellar peduncle which corresponds with matched defects in posterior   cerebral artery and anterior inferior cerebellar artery distributions. 2. There is presumed iatrogenic air in the right carotid siphon and right   cavernous sinus. 3. Unremarkable CTA of the head. XR CHEST PORTABLE   Final Result   No acute process. Assessment and Plan:  Principal Problem:    Stroke (cerebrum) ([facility] [address]  Active Problems:    Cerebrovascular accident (CVA) due to occlusion of right posterior cerebral artery (HCC)    Sinus bradycardia    Pulmonary emphysema (HCC)    Tobacco abuse    Morbid obesity with BMI of 40.0-44.9, adult ([address]    Hyperlipidemia  Resolved Problems:    Stroke-like symptom      Acute ischemic stroke  -Possibly due to atheroembolic event given right carotid stenosis  -Continue dual antiplatelet therapy with aspirin Plavix for 1 month followed by aspirin monotherapy per neurology recommendation  -Started on high intensity statin  -Echocardiogram ordered  -Continue telemetry monitoring. May ultimately need outpatient Zio patch  -Monitor blood pressure    2. Right carotid stenosis  -Consider vascular consult.   Patient is very anxious and prefers to be discharged from the hospital and back to senior care as soon as possible rather than transferred. She will at least need outpatient vascular surgery evaluation    3. Sinus bradycardia  -Asymptomatic  -Echocardiogram ordered  -Monitor on telemetry    4. COPD and tobacco abuse  -Without acute exacerbation  -As needed DuoNeb  -Counseled on tobacco cessation. States she has not smoked about 2 months    5. Obesity with BMI 41.16  -Counseled on diet and lifestyle modifications    DVT prophylaxis: Enoxaparin  CODE STATUS: Full code    Disposition: Anticipate 1-2 more days in the hospital prior to dual discharge to law enforcement    NOTE: This report was transcribed using voice recognition software. Every effort was made to ensure accuracy; however, inadvertent computerized transcription errors may be present.      Electronically signed by Mian Finney DO on 2/5/2023 at 3:26 PM

## 2023-02-05 NOTE — H&P
[address] [place], [place] [address]ist Group   History and Physical      CHIEF COMPLAINT:  weakness    History of Present Illness:  47 y.o. female with a history of CVA, HLD, morbid obesity, COPD presents with worsening left sided weakness and numbness. Reports 2 prior CVA she thinks the first was in 2021. Doesn't take her aspirin, plavix or statin, states she doesn't care. Was self-medicating with drugs instead. Symptoms initially started 2 days ago, felt like her head was being stabbed and felt numbness of her left side. That went away, but started again around dinner yesterday and did not go away. Numbness was accompanied by additional weakness beyond her baseline. Workup in ED significant for normal labs, imaging significant for perfusion mismatch right middle cerebellar peduncle and anteroinferior portion right temporal lobe corresponding to matched defects in posterior cerebral artery and anterior inferior cerebellar artery distributions. Admitted for further workup and treatment, out of the window for tpa. Informant(s) for H&P: patient, chart    REVIEW OF SYSTEMS:  no fevers, chills, cp, sob, n/v, ha, vision/hearing changes, wt changes, hot/cold flashes, other open skin lesions, diarrhea, constipation, dysuria/hematuria unless noted in HPI. Complete ROS performed with the patient and is otherwise negative.       PMH:  Past Medical History:   Diagnosis Date    CVA (cerebral vascular accident) ([facility] [address] 4/11/2020    Hyperlipidemia 4/12/2020    Morbid (severe) obesity due to excess calories [facility])     Pulmonary emphysema ([facility] [address] 1/13/2019       Surgical History:  Past Surgical History:   Procedure Laterality Date    CAROTID ENDARTERECTOMY Left 4/15/2020    CAROTID ENDARTERECTOMY performed by Emil Garcia MD at [address]         Medications Prior to Admission:    Prior to Admission medications    Medication Sig Start Date End Date Taking? Authorizing Provider   clopidogrel (PLAVIX) 75 MG tablet Take 1 tablet by mouth daily 10/20/22   MANUEL Devine - CNP   aspirin 81 MG EC tablet Take 1 tablet by mouth daily 4/17/20   Alla Caban MD   atorvastatin (LIPITOR) 40 MG tablet Take 1 tablet by mouth nightly 4/16/20   Alla Caban MD       Allergies:    Patient has no known allergies. Social History:    reports that she has been smoking cigarettes. She has a 14.00 pack-year smoking history. She has never used smokeless tobacco. She reports current alcohol use of about 1.0 standard drink per week. She reports that she does not use drugs. Family History:   family history includes Diabetes in her mother; High Blood Pressure in her mother; Other in her mother.      PHYSICAL EXAM:  Vitals:  /64   Pulse (!) 46   Temp 98.2 °F (36.8 °C) (Oral)   Resp 16   Ht 5' 6\" (1.676 m)   Wt 255 lb (115.7 kg)   LMP 05/19/2012   SpO2 99%   BMI 41.16 kg/m²     Constitutional:  NAD, awake, alert  Eyes: no scleral icterus, normal lids, no discharge  ENMT:  Normocephalic, atraumatic, mucosa moist, EOMI  Neck:  trachea midline, no JVD  Lungs:  CTA bilaterally, no audible rhonchi or wheezes noted, respirations unlabored, no retractions  Heart[de-identified]  RRR, no murmur, rub, or gallop noted during exam  Abd:  Soft, non tender, non distended, bowel sounds present  :  deferred  MSK: sarcopenia present  Ext:  Moving all extremities right greater than left, left arm and leg weaker than right, no edema, pulses present  Skin:  Warm and dry, no rashes on visible skin  Psych: non-anxious affect  Neuro:  PERRL, Alert, grossly nonfocal; following commands    LABS:  Recent Labs     02/04/23  2340 02/04/23  2343     --    K 3.9  --      --    CO2 25  --    BUN 8  --    CREATININE 0.6  --    GLUCOSE 98 93   CALCIUM 8.9  --        Recent Labs     02/04/23  2340   WBC 6.2   RBC 4.57   HGB 13.6   HCT 41.6   MCV 91.0   MCH 29.8   MCHC 32.7 RDW 11.9      MPV 10.5       No results for input(s): POCGLU in the last 72 hours. Radiology: CT HEAD WO CONTRAST    Result Date: 2/5/2023  EXAMINATION: CT OF THE HEAD WITHOUT CONTRAST  2/5/2023 12:17 am TECHNIQUE: CT of the head was performed without the administration of intravenous contrast. Automated exposure control, iterative reconstruction, and/or weight based adjustment of the mA/kV was utilized to reduce the radiation dose to as low as reasonably achievable. COMPARISON: None. HISTORY: ORDERING SYSTEM PROVIDED HISTORY: Left sided weakness TECHNOLOGIST PROVIDED HISTORY: Reason for exam:->Left sided weakness Has a \"code stroke\" or \"stroke alert\" been called? ->Yes Decision Support Exception - unselect if not a suspected or confirmed emergency medical condition->Emergency Medical Condition (MA) FINDINGS: BRAIN/VENTRICLES: There is no acute intracranial hemorrhage, mass effect or midline shift. No abnormal extra-axial fluid collection. The gray-white differentiation is maintained without evidence of an acute infarct. There is no evidence of hydrocephalus. ORBITS: The visualized portion of the orbits demonstrate no acute abnormality. SINUSES: The visualized paranasal sinuses and mastoid air cells demonstrate no acute abnormality. SOFT TISSUES/SKULL:  No acute abnormality of the visualized skull or soft tissues. No acute intracranial abnormality. XR CHEST PORTABLE    Result Date: 2/5/2023  EXAMINATION: ONE XRAY VIEW OF THE CHEST 2/5/2023 12:56 am COMPARISON: 10/18/2022 HISTORY: ORDERING SYSTEM PROVIDED HISTORY: Left sided weakness and numbness TECHNOLOGIST PROVIDED HISTORY: Reason for exam:->Left sided weakness and numbness FINDINGS: The lungs are without acute focal process. There is no effusion or pneumothorax. The cardiomediastinal silhouette is without acute process. The osseous structures are without acute process. No acute process.      CTA HEAD W CONTRAST    No definable compromise in the intracranial anterior circulation of bilateral internal carotid arteries. No definable compromise in the vertebrobasilar arterial circulation. No evidence of dural sinus thrombosis. In visualized brain there is no definable focal abnormality or acute process as on the CTA images. On the CT perfusion images, there is perfusion mismatch volume of 2 cc in the area of the right middle cerebellar peduncle and anteroinferior portion of right temporal lobe. These are subtle findings and of doubtful significance. But further evaluation with MRI of brain suggested. EKG: sinus bradycardia    ASSESSMENT:      Active Problems:    Stroke-like symptom    Cerebrovascular accident (CVA) due to occlusion of right posterior cerebral artery ([address]    Pulmonary emphysema (HCC)    Tobacco abuse    Morbid obesity with BMI of 40.0-44.9, adult ([address]    Hyperlipidemia  Resolved Problems:    * No resolved hospital problems. *      PLAN:    Acute CVA  Medical noncompliance  HLD  - asa/plavix  - neurology consult  - MRI/MRA brain/neck  - u/s carotids  - high intensity statin  - PT/OT eval    COPD-emphysema  - duonebs    Code Status: full  DVT prophylaxis: Lovenox    NOTE: This report was transcribed using voice recognition software. Every effort was made to ensure accuracy; however, inadvertent computerized transcription errors may be present.      Electronically signed by Mary Reilly DO on 2/5/2023 at 1:57 AM

## 2023-02-05 NOTE — ED NOTES
Time Neurologist page:2340      Time Stroke Alert called:2340  :    Time Neurologist called back:2347    X-Ray/CT notified: [address]  02/04/23 [address]  02/05/23 6401

## 2023-02-05 NOTE — CONSULTS
History Of Present Illness: Stroke Alert called: 4216     HISTORY OF PRESENT ILLNESS:  (Nurses Notes Reviewed)     Chief Complaint:   Tingling and Other (Pt had stroke before that affected left side. Pt states her left extremities feel tingly)        Source of history provided by:  patient and law enforcement. History/Exam Limitations: due to condition. Shayy Cordero is a 47 y.o. old female presenting to the emergency department by EMS, with gradual onset of left sided weakness and numbness, which began at approximately 1700. Last known well time: 1700. The episode occurred at longterm. Since recognized the symptoms have been persistent. She has history of stroke. She has stroke risk factors of: hyperlipidemia and smoking. There have been associated symptoms of none. There has been no history of recent trauma. as above per ed staff. Patient interviewed and reports new onset left sided weakness; CTP + right temporal stroke with MRI brain negative    The patient is a 47 y.o. female with significant past medical history of see below who presents with above. The patient has the following symptoms:    Change in level of consciousness: alert    New Weakness: yes    Numbness or Tingling: yes    Difficulty Swallowing: no    Current Medications:   Scheduled Meds:   atorvastatin  80 mg Oral Nightly    clopidogrel  75 mg Oral Daily    aspirin  81 mg Oral Daily    enoxaparin  30 mg SubCUTAneous BID     Continuous Infusions:  PRN Meds:perflutren lipid microspheres, ipratropium-albuterol    Allergies:  Patient has no allergy information on record. Social History:   TOBACCO:   reports that she has been smoking cigarettes. She has a 14.00 pack-year smoking history. She has never used smokeless tobacco.  ETOH:   reports current alcohol use of about 1.0 standard drink per week.     Past Medical History:        Diagnosis Date    CVA (cerebral vascular accident) ([facility] [address] 4/11/2020    Hyperlipidemia 4/12/2020 Morbid (severe) obesity due to excess calories [facility])     Pulmonary emphysema ([facility] [address] 1/13/2019       Past Surgical History:        Procedure Laterality Date    CAROTID ENDARTERECTOMY Left 4/15/2020    CAROTID ENDARTERECTOMY performed by Shiva Myles MD at [address]           Outside reports reviewed: ER records, historical medical records, lab reports and radiology reports. Patient's medications, allergies, past medical, surgical, social and family histories were reviewed and updated as appropriate. Review of Systems  A comprehensive review of systems was negative except for:       Objective:     Neuro exam 114/62 p 56 (MIN 46 bpm) t 98  General: normal orientation and alertness. Cranial nerve testing was normal.  Funduscopic eye exam revealed not testable. Motor exam:  left hemiparesis with full strength on right . Deep tendon reflexes were absent bilaterally. Plantar responses were left side extensor; right side flexor. Cerebellar exam noted  . Dewight Edison Sensation was decreased in the lower extremities.       Assessment:  Clinically and by CTP right hemispheric stroke  MRA carotids negative  Lipid profile excellent   Significant bradycardia noted          Plan:   Recommend DAPT for 1 month then reduce to asa 81 mg qd monotherapy to reduce risk of bleeding  Statin  Smoking cessation--motivated (reports 2 months since last smoked coinciding with imprisonment)  Recommend cardiac evaluation- echo, bradycardia   PT/?rehab

## 2023-02-06 VITALS
HEART RATE: 58 BPM | TEMPERATURE: 98.3 F | HEIGHT: 66 IN | BODY MASS INDEX: 40.98 KG/M2 | WEIGHT: 255 LBS | OXYGEN SATURATION: 96 % | SYSTOLIC BLOOD PRESSURE: 111 MMHG | RESPIRATION RATE: 18 BRPM | DIASTOLIC BLOOD PRESSURE: 69 MMHG

## 2023-02-06 PROBLEM — I65.21 STENOSIS OF RIGHT CAROTID ARTERY GREATER THAN 50%: Status: ACTIVE | Noted: 2023-02-06

## 2023-02-06 LAB
ANION GAP SERPL CALCULATED.3IONS-SCNC: 8 MMOL/L (ref 7–16)
BUN BLDV-MCNC: 9 MG/DL (ref 6–20)
CALCIUM SERPL-MCNC: 8.5 MG/DL (ref 8.6–10.2)
CHLORIDE BLD-SCNC: 110 MMOL/L (ref 98–107)
CO2: 25 MMOL/L (ref 22–29)
CREAT SERPL-MCNC: 0.7 MG/DL (ref 0.5–1)
GFR SERPL CREATININE-BSD FRML MDRD: >60 ML/MIN/1.73
GLUCOSE BLD-MCNC: 112 MG/DL (ref 74–99)
HCT VFR BLD CALC: 37.1 % (ref 34–48)
HEMOGLOBIN: 12.9 G/DL (ref 11.5–15.5)
LV EF: 58 %
LVEF MODALITY: NORMAL
MCH RBC QN AUTO: 30.8 PG (ref 26–35)
MCHC RBC AUTO-ENTMCNC: 34.8 % (ref 32–34.5)
MCV RBC AUTO: 88.5 FL (ref 80–99.9)
PDW BLD-RTO: 12.4 FL (ref 11.5–15)
PLATELET # BLD: 209 E9/L (ref 130–450)
PMV BLD AUTO: 10.7 FL (ref 7–12)
POTASSIUM SERPL-SCNC: 3.8 MMOL/L (ref 3.5–5)
RBC # BLD: 4.19 E12/L (ref 3.5–5.5)
SODIUM BLD-SCNC: 143 MMOL/L (ref 132–146)
WBC # BLD: 5.2 E9/L (ref 4.5–11.5)

## 2023-02-06 PROCEDURE — 92526 ORAL FUNCTION THERAPY: CPT | Performed by: SPEECH-LANGUAGE PATHOLOGIST

## 2023-02-06 PROCEDURE — 6370000000 HC RX 637 (ALT 250 FOR IP): Performed by: FAMILY MEDICINE

## 2023-02-06 PROCEDURE — 6370000000 HC RX 637 (ALT 250 FOR IP): Performed by: INTERNAL MEDICINE

## 2023-02-06 PROCEDURE — 85027 COMPLETE CBC AUTOMATED: CPT

## 2023-02-06 PROCEDURE — 36415 COLL VENOUS BLD VENIPUNCTURE: CPT

## 2023-02-06 PROCEDURE — 97161 PT EVAL LOW COMPLEX 20 MIN: CPT | Performed by: PHYSICAL THERAPIST

## 2023-02-06 PROCEDURE — 80048 BASIC METABOLIC PNL TOTAL CA: CPT

## 2023-02-06 PROCEDURE — G0378 HOSPITAL OBSERVATION PER HR: HCPCS

## 2023-02-06 PROCEDURE — 6360000002 HC RX W HCPCS: Performed by: FAMILY MEDICINE

## 2023-02-06 PROCEDURE — 92610 EVALUATE SWALLOWING FUNCTION: CPT | Performed by: SPEECH-LANGUAGE PATHOLOGIST

## 2023-02-06 PROCEDURE — 93306 TTE W/DOPPLER COMPLETE: CPT

## 2023-02-06 PROCEDURE — 97165 OT EVAL LOW COMPLEX 30 MIN: CPT

## 2023-02-06 PROCEDURE — 99238 HOSP IP/OBS DSCHRG MGMT 30/<: CPT | Performed by: INTERNAL MEDICINE

## 2023-02-06 PROCEDURE — 92523 SPEECH SOUND LANG COMPREHEN: CPT | Performed by: SPEECH-LANGUAGE PATHOLOGIST

## 2023-02-06 RX ORDER — ATORVASTATIN CALCIUM 40 MG/1
40 TABLET, FILM COATED ORAL NIGHTLY
Qty: 30 TABLET | Refills: 0 | Status: SHIPPED | OUTPATIENT
Start: 2023-02-06

## 2023-02-06 RX ORDER — CLOPIDOGREL BISULFATE 75 MG/1
75 TABLET ORAL DAILY
Qty: 30 TABLET | Refills: 0 | Status: SHIPPED | OUTPATIENT
Start: 2023-02-06

## 2023-02-06 RX ORDER — ASPIRIN 81 MG/1
81 TABLET ORAL DAILY
Qty: 30 TABLET | Refills: 5 | Status: SHIPPED | OUTPATIENT
Start: 2023-02-06

## 2023-02-06 RX ORDER — ACETAMINOPHEN 325 MG/1
650 TABLET ORAL EVERY 6 HOURS PRN
Status: DISCONTINUED | OUTPATIENT
Start: 2023-02-06 | End: 2023-02-06 | Stop reason: HOSPADM

## 2023-02-06 RX ADMIN — CLOPIDOGREL BISULFATE 75 MG: 75 TABLET ORAL at 09:24

## 2023-02-06 RX ADMIN — ASPIRIN 81 MG: 81 TABLET, COATED ORAL at 09:24

## 2023-02-06 RX ADMIN — ENOXAPARIN SODIUM 30 MG: 100 INJECTION SUBCUTANEOUS at 05:37

## 2023-02-06 RX ADMIN — ACETAMINOPHEN 650 MG: 325 TABLET ORAL at 10:02

## 2023-02-06 ASSESSMENT — PAIN DESCRIPTION - DESCRIPTORS: DESCRIPTORS: ACHING

## 2023-02-06 ASSESSMENT — PAIN SCALES - GENERAL
PAINLEVEL_OUTOF10: 0
PAINLEVEL_OUTOF10: 9

## 2023-02-06 ASSESSMENT — PAIN DESCRIPTION - ORIENTATION: ORIENTATION: LEFT

## 2023-02-06 ASSESSMENT — PAIN DESCRIPTION - LOCATION: LOCATION: HEAD;LEG

## 2023-02-06 NOTE — PROGRESS NOTES
OCCUPATIONAL THERAPY INITIAL EVALUATION    [facility]  [address] [facility] [place]. [place]        Date:2023                                                  Patient Name: Luh Guzman    MRN: [id number]    : 1970    Room: [facility] named [facility]17-      Evaluating OT: Gloriajames Rainey OTR/L #[id number]     Referring Provider and Specific Provider Orders/Date:      23  OT eval and treat  Start:  23,   End:  23,   ONE TIME,   Standing Count:  1 Occurrences,   R         Cynthia Allen,       Placement Recommendation: Home with Outpatient Rehab Services       Diagnosis:   1. Cerebrovascular accident (CVA), unspecified mechanism (HCC)         Surgery: None       Pertinent Medical History:       Past Medical History:   Diagnosis Date    CVA (cerebral vascular accident) (HCC) 2020    Hyperlipidemia 2020    Morbid (severe) obesity due to excess calories (HCC)     Pulmonary emphysema (HCC) 2019         Past Surgical History:   Procedure Laterality Date    CAROTID ENDARTERECTOMY Left 4/15/2020    CAROTID ENDARTERECTOMY performed by Romeo Triplett MD at [facility] OR     SECTION      CHOLECYSTECTOMY      TUBAL LIGATION      TUBAL LIGATION          Precautions:  Fall Risk, up as tolerated    US Carotid Artery  B 23:  The right internal carotid artery demonstrates 50-69% stenosis.       The left internal carotid artery demonstrates 0-50% stenosis.       Bilateral vertebral arteries are patent with flow in the normal direction.     MRI Brain WO Contrast 23:  1. No acute intracranial abnormality.  No acute infarct.   2. Tiny chronic infarcts again seen involving the left posterior frontal and   left parietal lobes.   3. Minimal chronic microvascular ischemic changes.   4. MRA of the head and neck demonstrate no significant change compared to the   CTA performed 6 hours prior.   5. Approximately 50% stenosis of the proximal  right cervical ICA by NASCET   criteria. Assessment of current deficits    [x] Functional mobility  [x]ADLs  [x] Strength               []Cognition    [x] Functional transfers   [x] IADLs         [] Safety Awareness   []Endurance    [x] Fine Coordination              [x] Balance      [] Vision/perception   []Sensation     [x]Gross Motor Coordination  [x] ROM  [] Delirium                   [] Motor Control     OT PLAN OF CARE   OT POC based on physician orders, patient diagnosis and results of clinical assessment    Frequency/Duration 1-3 days/wk for 2 weeks PRN     Specific OT Treatment Interventions to include:   * Instruction/training on adapted ADL techniques and AE recommendations to increase functional independence within precautions       * Training on energy conservation strategies, correct breathing pattern and techniques to improve independence/tolerance for self-care routine  * Functional transfer/mobility training/DME recommendations for increased independence, safety, and fall prevention  * Patient/Family education to increase follow through with safety techniques and functional independence  * Recommendation of environmental modifications for increased safety with functional transfers/mobility and ADLs  * Therapeutic exercise to improve motor endurance, ROM, and functional strength for ADLs/functional transfers  * Therapeutic activities to facilitate/challenge dynamic balance, stand tolerance for increased safety and independence with ADLs  * Therapeutic activities to facilitate gross/fine motor skills for increased independence with ADLs    Recommended Adaptive Equipment: TBD      Home Living: Patient currently incarcerated       Equipment owned: Shower chair and Tub transfer bench    Prior Level of Function: Independent with ADLs , Independent with IADLs; ambulated independently     Pain Level: Pt denied pain; Nursing notified.       Cognition: A&O: 4/4; Follows 3 step directions   Memory: good Sequencing: good    Problem solving: good    Judgement/safety: good     [facility]   AM-PAC Daily Activity - Inpatient   How much help is needed for putting on and taking off regular lower body clothing?: A Little  How much help is needed for bathing (which includes washing, rinsing, drying)?: A Little  How much help is needed for toileting (which includes using toilet, bedpan, or urinal)?: A Little  How much help is needed for putting on and taking off regular upper body clothing?: A Little  How much help is needed for taking care of personal grooming?: A Little  How much help for eating meals?: A Little  AM-[facility] Inpatient Daily Activity Raw Score: 18  AM-PAC Inpatient ADL T-Scale Score : 38.66  ADL Inpatient CMS 0-100% Score: 46.65  ADL Inpatient CMS G-Code Modifier : CK     Functional Assessment:    Initial Eval Status  Date: 2/6/23   Treatment Status  Date: STGs = LTGs  Time frame: 10-14 days   Feeding Minimal Assist     Moderate [place]    Grooming Minimal Assist    Moderate [place]    UB Dressing Stand by Assist    Moderate [place]    LB Dressing Stand by Assist   To doff/don socks seated at EOB. Increased time needed for L LE due to LE weakness. Moderate [place]    Bathing Minimal Assist     Moderate [place]    Toileting Stand by Assist     Moderate [place]    Bed Mobility  Supine to sit: Independent   Sit to supine: Independent     Supine to sit: Independent   Sit to supine: Independent    Functional Transfers Sit to stand: Supervision   Stand to sit: Supervision   Commode Transfer: Supervision     Transfer training with verbal cues for hand placement throughout session to improve safety. Independent    Functional Mobility Supervision without device greater than household distances. Verbal cues for overall safety. Left foot caterina noted.      Independent    Balance Sitting:     Static: good    Dynamic: good  Standing: fair/fair plus     Sitting:     Static: good    Dynamic: good  Standing: good    Activity Tolerance fair  plus   Increase standing tolerance >5 minutes for improved engagement with functional transfers and indep in ADLs     Visual/  Perceptual WFL      NA      Hand Dominance: Right      AROM (PROM) Strength Additional Info:  Goal:   RUE  WFL 4-/5 good  and decreased FMC/dexterity noted during ADL tasks ; hx of R hemiparesis from previous CVA    Improve overall RUE strength  for participation in functional tasks   LUE WFL 3/5 fair  and decreased FMC/dexterity noted during ADL tasks   Improve overall LUE strength  for participation in functional tasks     Hearing:  [facility]   Sensation:   No c/o numbness or tingling  Tone:  WFL   Edema: None     Comments: RN cleared patient for OT. Upon arrival patient in supine. Therapist facilitated and instructed pt on adapted  techniques & compensatory strategies to improve safety and independence with basic ADLs, bed mobility, functional transfers and mobility to allow pt to achieve highest level of independence and safely. Pt instructed on bedside activities to promote functional use of L UE and improved [address]. Pt demonstrated fair plus/good understanding of education & follow through. At end of session, patient was EOB with call light and phone within reach, all lines and tubes intact. Overall, patient demonstrated  decreased independence and safety during completion of ADL tasks. Pt would benefit from continued skilled OT to increase safety and independence with completion of ADL tasks and functional mobility for improved quality of life. Rehab Potential: Good for established goals. Patient / Family Goal: Pt in agreement with POC      Patient and/or family were instructed on functional diagnosis, prognosis/goals and OT plan of care. Demonstrated fair plus understanding.      Eval Complexity: Low    Time In: 2:25 PM   Time Out: 2:40 PM    Total Treatment Time: 0       Min Units   OT Eval Low 97165  X  1    OT Eval Medium 19130      OT Eval High [id number]      OT Re-Eval [id number]            ADL/Self Care [id number]     Therapeutic Activities 88538       Therapeutic Ex 37543       Orthotic Management 11621       Manual 18594     Neuro Re-Ed 10051       Non-Billable Time        Evaluation Time additionally includes thorough review of current medical information, gathering information on past medical history/social history and prior level of function, interpretation of standardized testing/informal observation of tasks, assessment of data and development of plan of care and goals.         Evaluating OT: Olga Nichols OTR/L #[id number]

## 2023-02-06 NOTE — DISCHARGE INSTRUCTIONS
Your information:  Name: Zaida Rubio  : 1970    Your instructions:    YOU ARE BEING DISCHARGED HOME. PLEASE MAKE AND KEEP YOUR FOLLOW UP APPOINTMENTS. What to do after you leave the hospital:    Recommended diet: regular diet    Recommended activity: activity as tolerated    IF YOU EXPERIENCE ANY OF THE FOLLOWING SYMPTOMS, CHEST PAIN, SHORTNESS OF BREATH, COUGHING UP BLOOD OR BLOODY SPUTUM, STOMACH PAIN OR CRAMPING, DARK, TARRY STOOLS, LOSS OF APPETITE, GENERAL NOT FEELING WELL, SIGNS AND SYMPTOMS OF INFECTION LIKE FEVER AND OR CHILLS, PLEASE CALL DR ROSS OR RETURN TO THE EMERGENCY ROOM. The following personal items were collected during your admission and were returned to you:    Belongings  Dental Appliances: None  Vision - Corrective Lenses: Eyeglasses  Hearing Aid: None  Clothing: Other (Comment) (orange jumpsuit with thongs)  Jewelry: None  Body Piercings Removed: N/A  Electronic Devices: None  Weapons (Notify Protective Services/Security): None  Other Valuables: Sent home  Home Medications: None  Valuables Given To: Patient  Provide Name(s) of Who Valuable(s) Were Given To: n/a  Responsible person(s) in the waiting room: n/a  Patient approves for provider to speak to responsible person post operatively: Yes    Information obtained by:  By signing below, I understand that if any problems occur once I leave the hospital I am to contact Dr Preston Rdz or go to emergency room. I understand and acknowledge receipt of the instructions indicated above.

## 2023-02-06 NOTE — PROGRESS NOTES
Speech Language Pathology      NAME:  Philippe Yarbrough  :  1970  DATE: 2023  ROOM:  [id number]    Patient seen for dysphagia therapy 15 minutes during lunch meal.  Feeding self. Patient tolerated meatloaf and liquids well. Upon further discussion regarding dysphagia symptoms she reports more esophageal symptoms then pharyngeal dysphagia symptoms. Discussed following up with GI if she continues to feel sensation of food not going down mid sternal area. She verbalized understanding. No further dysphagia therapy warranted at this time.      Stroke-like symptom [R29.90]    77400  dysphagia tx    Chris Brown MSCCC/SLP  Speech Language Pathologist  [id number]

## 2023-02-06 NOTE — PROGRESS NOTES
Physical Therapy Initial Evaluation/Plan of Care    Room #:  7853/6351-82  Patient Name: Maci Jara  YOB: 1970  MRN: [id number]    Date of Service: 2/6/2023     Tentative placement recommendation: Home with Outpatient Physical Therapy if needed  Equipment recommendation: None      Evaluating Physical Therapist: Mesha Kelsey PT #[id number]      Specific Provider Orders/Date/Referring Provider :  02/05/23 0200    PT evaluation and treat  Start:  02/05/23 0200,   End:  02/05/23 0200,   ONE TIME,   Standing Count:  1 Occurrences,   R         Carolina Arana DO     Admitting Diagnosis:   Stroke-like symptom [R29.90]     Tingling and Other (Pt had stroke before that affected left side. Pt states her left extremities feel tingly)   gradual onset of left sided weakness and numbness    Surgery: none  Visit Diagnoses         Codes    Cerebrovascular accident (CVA), unspecified mechanism ([facility]    -  Primary I63.9            Patient Active Problem List   Diagnosis    Pulmonary emphysema ([address]    Tobacco abuse    Morbid obesity with BMI of 40.0-44.9, adult ([address]    Hyperlipidemia    Symptomatic stenosis of left carotid artery    Cerebrovascular accident (CVA) due to occlusion of right posterior cerebral artery ([address]    Stroke (cerebrum) (HCC)    Sinus bradycardia        ASSESSMENT of Current Deficits Patient exhibits decreased strength, balance, and endurance impairing functional mobility, transfers, gait , gait distance, and tolerance to activity left foot drag during gait, no Loss of balance. Patient requires continued skilled physical therapy to address concerns listed above for increased safety and function at discharge.          PHYSICAL THERAPY  PLAN OF CARE       Physical therapy plan of care is established based on physician order,  patient diagnosis and clinical assessment    Current Treatment Recommendations:    -Gait: Gait training and Standing activities to improve: base of support, weight shift, weight bearing    -Endurance: Utilize Supervised activities to increase level of endurance to allow for safe functional mobility including transfers and gait   -Stairs: Stair training with instruction on proper technique and hand placement on rail    PT long term treatment goals are located in below grid    Patient and or family understand(s) diagnosis, prognosis, and plan of care. Frequency of treatments: Patient will be seen  daily. Prior Level of Function: Patient ambulated independently    Rehab Potential: good   for baseline    Past medical history:   Past Medical History:   Diagnosis Date    CVA (cerebral vascular accident) ([address] 4/11/2020    Hyperlipidemia 4/12/2020    Morbid (severe) obesity due to excess calories ([address]     Pulmonary emphysema ([address] 1/13/2019     Past Surgical History:   Procedure Laterality Date    CAROTID ENDARTERECTOMY Left 4/15/2020    CAROTID ENDARTERECTOMY performed by Mihai Lopes MD at [address] [place] [address] St:    Precautions: Up as tolerated and patient may shower , falls ,      Imaging results: CT HEAD WO CONTRAST    Addendum Date: 2/5/2023    ADDENDUM: Preliminary findings were discussed with Dr. Agnieszka Espana on  02/05/2023 at 2:20 a.m. EST. Result Date: 2/5/2023  EXAMINATION: CT OF THE HEAD WITHOUT CONTRAST  2/5/2023 12:17 am    No acute intracranial abnormality. MRA head without contrast    Result Date: 2/5/2023  EXAMINATION: MRI OF THE BRAIN WITHOUT CONTRAST; MRA OF THE HEAD WITHOUT CONTRAST; MRA OF THE NECK WITHOUT CONTRAST 2/5/2023 7:02 am:    1. No acute intracranial abnormality. No acute infarct. 2. Tiny chronic infarcts again seen involving the left posterior frontal and left parietal lobes. 3. Minimal chronic microvascular ischemic changes. 4. MRA of the head and neck demonstrate no significant change compared to the CTA performed 6 hours prior.  5. Approximately 50% stenosis of the proximal right cervical ICA by NASCET criteria. XR CHEST PORTABLE    Result Date: 2/5/2023  EXAMINATION: ONE XRAY VIEW OF THE CHEST 2/5/2023 12:56 am    No acute process. CTA NECK W CONTRAST    Result Date: 2/5/2023  EXAMINATION: CTA OF THE NECK 2/5/2023 12:17 am    Pre-existing 50% stenosis in the proximal portion of the right internal carotid artery, about 1.5 cm superior to the origin, as also noted on previous CTA on 10/18/2022. There is no evidence of any calcified plaque at this level of stenosis. In the rest of the cervical segment of the right ICA there is no additional stenosis or plaque. No stenosis in the left internal carotid artery. No evidence of stenosis or compromise in bilateral vertebral arteries in the neck. Some locules of air in the right cavernous sinus area is likely to be due to injection of some air with the contrast and tracking of contrast through the right internal jugular vein and skull base venous structures, through the level of right cavernous sinus. This finding is likely to be benign. CT BRAIN PERFUSION    Addendum Date: 2/5/2023    ADDENDUM: Comparison to CT perfusion, 10/18/2022: This hypoperfusion defects in the posterior temporal and cerebellar peduncle represent a new interval change, thus remains suspicious for a hyperacute ischemic event without significant penumbra. Preliminary findings were discussed with Siria Erwin MD.     Result Date: 2/5/2023  EXAMINATION: CTA OF THE HEAD WITH CONTRAST WITH PERFUSION 2/5/2023 12:17 am:      1. There is pattern of hyperacute infarction in the right temporal lobe and right cerebellar peduncle which corresponds with matched defects in posterior cerebral artery and anterior inferior cerebellar artery distributions. 2. There is presumed iatrogenic air in the right carotid siphon and right cavernous sinus. 3. Unremarkable CTA of the head.      CTA HEAD W CONTRAST    Result Date: 2/5/2023  EXAMINATION: CTA OF THE HEAD WITH CONTRAST 2/5/2023 12:17 am:    No definable compromise in the intracranial anterior circulation of bilateral internal carotid arteries. No definable compromise in the vertebrobasilar arterial circulation. No evidence of dural sinus thrombosis. In visualized brain there is no definable focal abnormality or acute process as on the CTA images. On the CT perfusion images, there is perfusion mismatch volume of 2 cc in the area of the right middle cerebellar peduncle and anteroinferior portion of right temporal lobe. These are subtle findings and of doubtful significance. But further evaluation with MRI of brain suggested. US CAROTID ARTERY BILATERAL    Result Date: 2/5/2023  EXAMINATION: ULTRASOUND EVALUATION OF THE CAROTID ARTERIES 2/5/2023    The right internal carotid artery demonstrates 50-69% stenosis. The left internal carotid artery demonstrates 0-50% stenosis. Bilateral vertebral arteries are patent with flow in the normal direction. Social history: Patient incarcerated    lives with daughter in a two story home resides first  with 3 steps, with rail  to enter home.    Tub shower      Equipment owned: Shower chair and Tub transfer bench,       [address] [facility] - Inpatient   How much help is needed turning from your back to your side while in a flat bed without using bedrails?: None  How much help is needed moving from lying on your back to sitting on the side of a flat bed without using bedrails?: None  How much help is needed moving to and from a bed to a chair?: A Little  How much help is needed standing up from a chair using your arms?: A Little  How much help is needed walking in hospital room?: A Little  How much help is needed climbing 3-5 steps with a railing?: A Little  AM-PAC Inpatient Mobility Raw Score : 20  AM-PAC Inpatient T-Scale Score : 47.67  Mobility Inpatient CMS 0-100% Score: 35.83  Mobility Inpatient CMS G-Code Modifier : [address] [facility] [place] cleared patient for PT evaluation. The admitting diagnosis and active problem list as listed above have been reviewed prior to the initiation of this evaluation. OBJECTIVE;   Initial Evaluation  Date: 2/6/2023 Treatment Date:     Short Term/ Long Term   Goals   Was pt agreeable to Eval/treatment? Yes  To be met in 2 days   Pain level   7/10  Left hip     Bed Mobility    Rolling: Independent    Supine to sit:  Independent    Sit to supine: Independent    Scooting: Independent       Transfers Sit to stand: Supervision     Sit to stand: Independent     Ambulation     130 feet using  no device with Supervision    Patient with foot flat left foot drag and cues for safety    150 feet using  no device with Independent    Stair negotiation: ascended and descended   Not assessed     3 steps, 1 rail, Supervision        ROM Within functional limits    Increase range of motion 10% of affected joints    Strength BUE:  refer to OT eval  RLE:  4+/5  LLE:  3+/5  Increase strength in affected mm groups by 1/3 grade   Balance Sitting EOB:  good    Dynamic Standing:  fair +  Sitting EOB:  good    Dynamic Standing: good       Patient is Alert & Oriented x person, place, time, and situation and follows directions    Sensation:  Patient  reports tingling toes and fingers on left     Edema:  no   Endurance: fair       Vitals: room air   Blood Pressure at rest  Blood Pressure during session    Heart Rate at rest 61 Heart Rate during session     SPO2 at rest 96%  SPO2 during session  %     Patient education  Patient educated on role of Physical Therapy, risks of immobility, safety and plan of care and  importance of mobility while in hospital      Patient response to education:   Pt verbalized understanding Pt demonstrated skill Pt requires further education in this area   Yes Partial Yes      Treatment:  Patient practiced and was instructed/facilitated in the following treatment: Patient transferred to edge of bed,   Sat edge of bed 10 minutes with No assist to increase dynamic sitting balance and activity tolerance. Decreased rapid alternating movement Left upper extremity and Left lower extremity. Left flat foot drag during gait, no Loss of balance. Returned to bed. Therapeutic Exercises:  not performed  x   reps. At end of session, patient in bed with     call light and phone within reach,  all lines and tubes intact, nursing notified. Patient would benefit from continued skilled Physical Therapy to improve functional independence and quality of life. Patient's/ family goals   home    Time in  0927  Time out  0947    Total Treatment Time  0 minutes    Evaluation time includes thorough review of current medical information, gathering information on past medical history/social history and prior level of function, completion of standardized testing/informal observation of tasks, assessment of data, and development of Plan of care and goals.      CPT codes:  Low Complexity PT evaluation (80047)  No treatment    Steven Thomason, PT

## 2023-02-06 NOTE — DISCHARGE SUMMARY
[facility] Physician Discharge Summary       Jennifer Jefferson [facility] [id number]    Schedule an appointment as soon as possible for a visit in 1 week(s)  Hospital follow up]    Joshua Davis MD  [address] [place] [address] Tamir Garrison [address]  [phone]    Schedule an appointment as soon as possible for a visit in 2 week(s)  To discuss right carotid stenosis. Left carotid surgery was done April 2020      Activity level: ***    Diet: ADULT DIET; Regular    Labs:***    Condition at discharge: ***    Dispo:***    Continue supplemental oxygen via nasal canula @ 2 LPM round-the-clock. Continue CPAP / BiPAP during sleep as prior to admission.     Patient ID:  Philippe Yarbrough  [id number]  69 y.o.  1970    Admit date: 2/4/2023    Discharge date and time:  2/6/2023  4:03 PM    Admission Diagnoses: Principal Problem:    Stroke (cerebrum) [facility])  Active Problems:    Cerebrovascular accident (CVA) due to occlusion of right posterior cerebral artery ([address]    Sinus bradycardia    Pulmonary emphysema ([address]    Tobacco abuse    Morbid obesity with BMI of 40.0-44.9, adult ([address]    Hyperlipidemia  Resolved Problems:    Stroke-like symptom      Discharge Diagnoses: Principal Problem:    Stroke (cerebrum) ([address]  Active Problems:    Cerebrovascular accident (CVA) due to occlusion of right posterior cerebral artery (HCC)    Sinus bradycardia    Pulmonary emphysema (HCC)    Tobacco abuse    Morbid obesity with BMI of 40.0-44.9, adult ([address]    Hyperlipidemia  Resolved Problems:    Stroke-like symptom      Consults:  IP CONSULT TO NEUROLOGY    Procedures: ***    Hospital Course: ***    Discharge Exam:  Vitals:    02/05/23 0634 02/05/23 1903 02/05/23 2330 02/06/23 0915   BP: 114/62 134/72 122/70 111/69   Pulse: 56 72 77 58   Resp: 16 18 18 18   Temp: 97.9 °F (36.6 °C) 97.9 °F (36.6 °C) 98 °F (36.7 °C) 98.3 °F (36.8 °C)   TempSrc: Oral Oral  Oral   SpO2: 97% 95% 95% 96%   Weight:       Height:           {GENERAL PHYSICAL EXAM:19990}  I/O last 3 completed shifts: In: 1000 [P.O.:1000]  Out: -   I/O this shift: In: 12 [P.O.:960]  Out: -       LABS:  Recent Labs     02/04/23 2340 02/04/23 2343 02/06/23  0503     --  143   K 3.9  --  3.8     --  110*   CO2 25  --  25   BUN 8  --  9   CREATININE 0.6  --  0.7   GLUCOSE 98 93 112*   CALCIUM 8.9  --  8.5*       Recent Labs     02/04/23 2340 02/06/23  0503   WBC 6.2 5.2   RBC 4.57 4.19   HGB 13.6 12.9   HCT 41.6 37.1   MCV 91.0 88.5   MCH 29.8 30.8   MCHC 32.7 34.8*   RDW 11.9 12.4    209   MPV 10.5 10.7       No results for input(s): POCGLU in the last 72 hours. {MHZQ:[id number]}    Imaging:  CT HEAD WO CONTRAST    Addendum Date: 2/5/2023    ADDENDUM: Preliminary findings were discussed with Dr. Autumn Jefferson on  02/05/2023 at 2:20 a.m. EST. Result Date: 2/5/2023  EXAMINATION: CT OF THE HEAD WITHOUT CONTRAST  2/5/2023 12:17 am TECHNIQUE: CT of the head was performed without the administration of intravenous contrast. Automated exposure control, iterative reconstruction, and/or weight based adjustment of the mA/kV was utilized to reduce the radiation dose to as low as reasonably achievable. COMPARISON: None. HISTORY: ORDERING SYSTEM PROVIDED HISTORY: Left sided weakness TECHNOLOGIST PROVIDED HISTORY: Reason for exam:->Left sided weakness Has a \"code stroke\" or \"stroke alert\" been called? ->Yes Decision Support Exception - unselect if not a suspected or confirmed emergency medical condition->Emergency Medical Condition (MA) FINDINGS: BRAIN/VENTRICLES: There is no acute intracranial hemorrhage, mass effect or midline shift. No abnormal extra-axial fluid collection. The gray-white differentiation is maintained without evidence of an acute infarct. There is no evidence of hydrocephalus. ORBITS: The visualized portion of the orbits demonstrate no acute abnormality.  SINUSES: The visualized paranasal sinuses and mastoid air cells demonstrate no acute abnormality. SOFT TISSUES/SKULL:  No acute abnormality of the visualized skull or soft tissues. No acute intracranial abnormality. MRA head without contrast    Result Date: 2/5/2023  EXAMINATION: MRI OF THE BRAIN WITHOUT CONTRAST; MRA OF THE HEAD WITHOUT CONTRAST; MRA OF THE NECK WITHOUT CONTRAST 2/5/2023 7:02 am: TECHNIQUE: Multiplanar multisequence MRI of the brain was performed without the administration of intravenous contrast.; MRA of the head was performed utilizing time-of-flight imaging with MIP images. No intravenous contrast was administered.; Multiplanar multisequence MRA of the neck was performed without the administration of intravenous contrast. Stenosis of the internal carotid arteries measured using NASCET criteria. COMPARISON: CTA 02/05/2023. MRI brain 10/19/2022. HISTORY: ORDERING SYSTEM PROVIDED HISTORY: stroke like symptoms TECHNOLOGIST PROVIDED HISTORY: Reason for exam:->stroke like symptoms What is the sedation requirement?->None Decision Support Exception - unselect if not a suspected or confirmed emergency medical condition->Emergency Medical Condition (MA) Initial evaluation. FINDINGS: MRI BRAIN: INTRACRANIAL STRUCTURES/VENTRICLES: There is no acute infarct. There is a small chronic infarct involving the posterior left frontal lobe along is the precentral gyrus. A tiny chronic infarct is also seen within the left parietal lobe. No mass effect or midline shift. No evidence of an acute intracranial hemorrhage. Minimal scattered foci of T2 FLAIR hyperintensity are seen in the periventricular and subcortical white matter, which are nonspecific, but may represent chronic microvascular ischemic change. The ventricles and sulci are normal in size and configuration. The sellar/suprasellar regions appear unremarkable. The normal signal voids within the major intracranial vessels appear maintained.  ORBITS: The visualized portion of the orbits demonstrate no acute abnormality. SINUSES: The visualized paranasal sinuses and mastoid air cells demonstrate no acute abnormality. BONES/SOFT TISSUES: The bone marrow signal intensity appears normal. The soft tissues demonstrate no acute abnormality. MRA NECK: Evaluation is limited due to patient motion as well as lack of intravenous contrast. AORTIC ARCH/ARCH VESSELS: The aortic arch and arch vessels are not included on this exam. CAROTID ARTERIES: No significant stenosis of the visualized common carotid arteries. There is approximately 50% stenosis of the proximal right cervical ICA by NASCET criteria. No focal stenosis of the visualized left cervical ICA by NASCET criteria. VERTEBRAL ARTERIES: No significant stenosis of the visualized vertebral arteries. MRA HEAD: ANTERIOR CIRCULATION: No significant stenosis of the intracranial internal carotid, anterior cerebral, or middle cerebral arteries. POSTERIOR CIRCULATION: No significant stenosis of the vertebral, basilar, or posterior cerebral arteries. No intracranial aneurysm identified. 1. No acute intracranial abnormality. No acute infarct. 2. Tiny chronic infarcts again seen involving the left posterior frontal and left parietal lobes. 3. Minimal chronic microvascular ischemic changes. 4. MRA of the head and neck demonstrate no significant change compared to the CTA performed 6 hours prior. 5. Approximately 50% stenosis of the proximal right cervical ICA by NASCET criteria. XR CHEST PORTABLE    Result Date: 2/5/2023  EXAMINATION: ONE XRAY VIEW OF THE CHEST 2/5/2023 12:56 am COMPARISON: 10/18/2022 HISTORY: ORDERING SYSTEM PROVIDED HISTORY: Left sided weakness and numbness TECHNOLOGIST PROVIDED HISTORY: Reason for exam:->Left sided weakness and numbness FINDINGS: The lungs are without acute focal process. There is no effusion or pneumothorax. The cardiomediastinal silhouette is without acute process. The osseous structures are without acute process. No acute process. CTA NECK W CONTRAST    Result Date: 2/5/2023  EXAMINATION: CTA OF THE NECK 2/5/2023 12:17 am TECHNIQUE: CTA of the neck was performed with the administration of intravenous contrast. Multiplanar reformatted images are provided for review. MIP images are provided for review. Stenosis of the internal carotid arteries measured using NASCET criteria. Automated exposure control, iterative reconstruction, and/or weight based adjustment of the mA/kV was utilized to reduce the radiation dose to as low as reasonably achievable. COMPARISON: CTA of arteries of neck on 10/18/2022. HISTORY: ORDERING SYSTEM PROVIDED HISTORY: Left sided weakness and numbness TECHNOLOGIST PROVIDED HISTORY: Reason for exam:->Left sided weakness and numbness Has a \"code stroke\" or \"stroke alert\" been called? ->Yes Decision Support Exception - unselect if not a suspected or confirmed emergency medical condition->Emergency Medical Condition (MA) FINDINGS: AORTIC ARCH/ARCH VESSELS: No dissection or arterial injury. No significant stenosis of the brachiocephalic or subclavian arteries. CAROTID ARTERIES: No dissection, or arterial injury. In the proximal portion of the right internal carotid artery, 1.5 cm superior to the origin of the right ICA, there is 50% stenosis in a short segment without calcified plaque, but unchanged as compared to previous CTA on 10/18/2022. In the rest of the right ICA in neck, there is no additional plaque or stenosis identified. In the left internal carotid artery there is no stenosis. VERTEBRAL ARTERIES: No dissection, arterial injury, or significant stenosis. SOFT TISSUES: The lung apices are clear. No cervical or superior mediastinal lymphadenopathy. The larynx and pharynx are unremarkable. No acute abnormality of the salivary and thyroid glands. Evidence of some locules of air in the right cavernous sinus area around the cavernous segment of right internal carotid artery.   Small locule of air can be identified in relation to the medial portion of petrous segment of right ICA. Small locule of air can be identified in the lower portion of right internal jugular vein and in some small veins in the base of skull. The findings are thought to be due to some injection of air with the contrast and then tracking of the air upwards through the venous structures up to the level of the right cavernous sinus. BONES: Evidence of severe degenerative disc disease at C5-C6 level of cervical spine with mild central stenosis. Pre-existing 50% stenosis in the proximal portion of the right internal carotid artery, about 1.5 cm superior to the origin, as also noted on previous CTA on 10/18/2022. There is no evidence of any calcified plaque at this level of stenosis. In the rest of the cervical segment of the right ICA there is no additional stenosis or plaque. No stenosis in the left internal carotid artery. No evidence of stenosis or compromise in bilateral vertebral arteries in the neck. Some locules of air in the right cavernous sinus area is likely to be due to injection of some air with the contrast and tracking of contrast through the right internal jugular vein and skull base venous structures, through the level of right cavernous sinus. This finding is likely to be benign. CT BRAIN PERFUSION    Addendum Date: 2/5/2023    ADDENDUM: Comparison to CT perfusion, 10/18/2022: This hypoperfusion defects in the posterior temporal and cerebellar peduncle represent a new interval change, thus remains suspicious for a hyperacute ischemic event without significant penumbra. Preliminary findings were discussed with Sarahy Dan MD.     Result Date: 2/5/2023  EXAMINATION: CTA OF THE HEAD WITH CONTRAST WITH PERFUSION 2/5/2023 12:17 am: TECHNIQUE: CTA of the head/brain was performed with the administration of intravenous contrast. Multiplanar reformatted images are provided for review.   MIP images are provided for review. Automated exposure control, iterative reconstruction, and/or weight based adjustment of the mA/kV was utilized to reduce the radiation dose to as low as reasonably achievable. COMPARISON: None. HISTORY: ORDERING SYSTEM PROVIDED HISTORY: Left side weakness and numbness TECHNOLOGIST PROVIDED HISTORY: Reason for exam:->Left side weakness and numbness Has a \"code stroke\" or \"stroke alert\" been called? ->Yes Decision Support Exception - unselect if not a suspected or confirmed emergency medical condition->Emergency Medical Condition (MA) FINDINGS: On CBF and CBV, there are regions of decreased perfusion in the posteroinferior right temporal lobe and right cerebellar peduncle. TT map shows a corresponding prolongation in the same regions. This represents essentially matched defects in the right posterior cerebral artery and right anterior inferior cerebellar artery territories. CT PERFUSION: EXAM QUALITY: The examination is diagnostic with appropriate arterial inflow and venous outflow curves, and diagnostic perfusion maps. CORE INFARCT: The total area of ischemic core is 4  mL (CBF<30% volume). TOTAL HYPOPERFUSION: The total area of hypoperfusion is 6 mL (Tmax>6s volume). PENUMBRA: The penumbra (mismatch) volume is 2 mL and is located in the right cerebellar peduncle. The mismatch ratio is 1.5. CTA HEAD: ANTERIOR CIRCULATION: There is moderate atherosclerotic plaque in the cavernous/carotid siphon internal carotid arteries bilaterally. There are foci air in the right carotid canal and right cavernous sinus which is presumably iatrogenic. No significant stenosis of the anterior cerebral or middle cerebral arteries. No aneurysm. POSTERIOR CIRCULATION: No significant stenosis of the vertebral, basilar, or posterior cerebral arteries. No aneurysm. OTHER: No dural venous sinus thrombosis on this non-dedicated study. BRAIN: No mass effect or midline shift. No extra-axial fluid collection.  The gray-white differentiation is maintained. 1. There is pattern of hyperacute infarction in the right temporal lobe and right cerebellar peduncle which corresponds with matched defects in posterior cerebral artery and anterior inferior cerebellar artery distributions. 2. There is presumed iatrogenic air in the right carotid siphon and right cavernous sinus. 3. Unremarkable CTA of the head. CTA HEAD W CONTRAST    Result Date: 2/5/2023  EXAMINATION: CTA OF THE HEAD WITH CONTRAST 2/5/2023 12:17 am: TECHNIQUE: CTA of the head/brain was performed with the administration of intravenous contrast. Multiplanar reformatted images are provided for review. MIP images are provided for review. Automated exposure control, iterative reconstruction, and/or weight based adjustment of the mA/kV was utilized to reduce the radiation dose to as low as reasonably achievable. COMPARISON: None. HISTORY: ORDERING SYSTEM PROVIDED HISTORY: Left sided weakness and numbness TECHNOLOGIST PROVIDED HISTORY: Reason for exam:->Left sided weakness and numbness Has a \"code stroke\" or \"stroke alert\" been called? ->Yes Decision Support Exception - unselect if not a suspected or confirmed emergency medical condition->Emergency Medical Condition (MA) FINDINGS: ANTERIOR CIRCULATION: No significant stenosis of the intracranial internal carotid, anterior cerebral, or middle cerebral arteries. No aneurysm. POSTERIOR CIRCULATION: No significant stenosis of the vertebral, basilar, or posterior cerebral arteries. No aneurysm. OTHER: No dural venous sinus thrombosis on this non-dedicated study. BRAIN: No mass effect or midline shift. No extra-axial fluid collection. The gray-white differentiation is maintained. Note is made of small locules of air around the cavernous segment of right internal carotid artery. Small locule of air can be identified around the petrous carotid segment of right internal carotid artery.   Note is made of small amount of air in the lower portion of right internal jugular vein and some of the venous structure inferior to base of skull. These locules of air are likely to be due to some injected air with contrast and then tracking upwards to cavernous sinus area. Findings on CT perfusion images: ------------------------------ RCBF< 30%: 4 cc TMax > 6s: 6cc Mismatch Volume: 2 cc Mismatch ratio: 1.5. Perfusion mismatch appears to be in the right middle cerebellar peduncle and in the anterior portion of right temporal lobe. No definable compromise in the intracranial anterior circulation of bilateral internal carotid arteries. No definable compromise in the vertebrobasilar arterial circulation. No evidence of dural sinus thrombosis. In visualized brain there is no definable focal abnormality or acute process as on the CTA images. On the CT perfusion images, there is perfusion mismatch volume of 2 cc in the area of the right middle cerebellar peduncle and anteroinferior portion of right temporal lobe. These are subtle findings and of doubtful significance. But further evaluation with MRI of brain suggested. MRA neck without contrast    Result Date: 2/5/2023  EXAMINATION: MRI OF THE BRAIN WITHOUT CONTRAST; MRA OF THE HEAD WITHOUT CONTRAST; MRA OF THE NECK WITHOUT CONTRAST 2/5/2023 7:02 am: TECHNIQUE: Multiplanar multisequence MRI of the brain was performed without the administration of intravenous contrast.; MRA of the head was performed utilizing time-of-flight imaging with MIP images. No intravenous contrast was administered.; Multiplanar multisequence MRA of the neck was performed without the administration of intravenous contrast. Stenosis of the internal carotid arteries measured using NASCET criteria. COMPARISON: CTA 02/05/2023. MRI brain 10/19/2022.  HISTORY: ORDERING SYSTEM PROVIDED HISTORY: stroke like symptoms TECHNOLOGIST PROVIDED HISTORY: Reason for exam:->stroke like symptoms What is the sedation requirement?->None Decision Support Exception - unselect if not a suspected or confirmed emergency medical condition->Emergency Medical Condition (MA) Initial evaluation. FINDINGS: MRI BRAIN: INTRACRANIAL STRUCTURES/VENTRICLES: There is no acute infarct. There is a small chronic infarct involving the posterior left frontal lobe along is the precentral gyrus. A tiny chronic infarct is also seen within the left parietal lobe. No mass effect or midline shift. No evidence of an acute intracranial hemorrhage. Minimal scattered foci of T2 FLAIR hyperintensity are seen in the periventricular and subcortical white matter, which are nonspecific, but may represent chronic microvascular ischemic change. The ventricles and sulci are normal in size and configuration. The sellar/suprasellar regions appear unremarkable. The normal signal voids within the major intracranial vessels appear maintained. ORBITS: The visualized portion of the orbits demonstrate no acute abnormality. SINUSES: The visualized paranasal sinuses and mastoid air cells demonstrate no acute abnormality. BONES/SOFT TISSUES: The bone marrow signal intensity appears normal. The soft tissues demonstrate no acute abnormality. MRA NECK: Evaluation is limited due to patient motion as well as lack of intravenous contrast. AORTIC ARCH/ARCH VESSELS: The aortic arch and arch vessels are not included on this exam. CAROTID ARTERIES: No significant stenosis of the visualized common carotid arteries. There is approximately 50% stenosis of the proximal right cervical ICA by NASCET criteria. No focal stenosis of the visualized left cervical ICA by NASCET criteria. VERTEBRAL ARTERIES: No significant stenosis of the visualized vertebral arteries. MRA HEAD: ANTERIOR CIRCULATION: No significant stenosis of the intracranial internal carotid, anterior cerebral, or middle cerebral arteries. POSTERIOR CIRCULATION: No significant stenosis of the vertebral, basilar, or posterior cerebral arteries.  No intracranial aneurysm identified. 1. No acute intracranial abnormality. No acute infarct. 2. Tiny chronic infarcts again seen involving the left posterior frontal and left parietal lobes. 3. Minimal chronic microvascular ischemic changes. 4. MRA of the head and neck demonstrate no significant change compared to the CTA performed 6 hours prior. 5. Approximately 50% stenosis of the proximal right cervical ICA by NASCET criteria. MRI brain without contrast    Result Date: 2/5/2023  EXAMINATION: MRI OF THE BRAIN WITHOUT CONTRAST; MRA OF THE HEAD WITHOUT CONTRAST; MRA OF THE NECK WITHOUT CONTRAST 2/5/2023 7:02 am: TECHNIQUE: Multiplanar multisequence MRI of the brain was performed without the administration of intravenous contrast.; MRA of the head was performed utilizing time-of-flight imaging with MIP images. No intravenous contrast was administered.; Multiplanar multisequence MRA of the neck was performed without the administration of intravenous contrast. Stenosis of the internal carotid arteries measured using NASCET criteria. COMPARISON: CTA 02/05/2023. MRI brain 10/19/2022. HISTORY: ORDERING SYSTEM PROVIDED HISTORY: stroke like symptoms TECHNOLOGIST PROVIDED HISTORY: Reason for exam:->stroke like symptoms What is the sedation requirement?->None Decision Support Exception - unselect if not a suspected or confirmed emergency medical condition->Emergency Medical Condition (MA) Initial evaluation. FINDINGS: MRI BRAIN: INTRACRANIAL STRUCTURES/VENTRICLES: There is no acute infarct. There is a small chronic infarct involving the posterior left frontal lobe along is the precentral gyrus. A tiny chronic infarct is also seen within the left parietal lobe. No mass effect or midline shift. No evidence of an acute intracranial hemorrhage. Minimal scattered foci of T2 FLAIR hyperintensity are seen in the periventricular and subcortical white matter, which are nonspecific, but may represent chronic microvascular ischemic change. The ventricles and sulci are normal in size and configuration.  The sellar/suprasellar regions appear unremarkable.  The normal signal voids within the major intracranial vessels appear maintained. ORBITS: The visualized portion of the orbits demonstrate no acute abnormality. SINUSES: The visualized paranasal sinuses and mastoid air cells demonstrate no acute abnormality. BONES/SOFT TISSUES: The bone marrow signal intensity appears normal. The soft tissues demonstrate no acute abnormality. MRA NECK: Evaluation is limited due to patient motion as well as lack of intravenous contrast. AORTIC ARCH/ARCH VESSELS: The aortic arch and arch vessels are not included on this exam. CAROTID ARTERIES: No significant stenosis of the visualized common carotid arteries.  There is approximately 50% stenosis of the proximal right cervical ICA by NASCET criteria.  No focal stenosis of the visualized left cervical ICA by NASCET criteria. VERTEBRAL ARTERIES: No significant stenosis of the visualized vertebral arteries. MRA HEAD: ANTERIOR CIRCULATION: No significant stenosis of the intracranial internal carotid, anterior cerebral, or middle cerebral arteries. POSTERIOR CIRCULATION: No significant stenosis of the vertebral, basilar, or posterior cerebral arteries. No intracranial aneurysm identified.     1. No acute intracranial abnormality.  No acute infarct. 2. Tiny chronic infarcts again seen involving the left posterior frontal and left parietal lobes. 3. Minimal chronic microvascular ischemic changes. 4. MRA of the head and neck demonstrate no significant change compared to the CTA performed 6 hours prior. 5. Approximately 50% stenosis of the proximal right cervical ICA by NASCET criteria.     US CAROTID ARTERY BILATERAL    Result Date: 2/5/2023  EXAMINATION: ULTRASOUND EVALUATION OF THE CAROTID ARTERIES 2/5/2023 TECHNIQUE: Duplex ultrasound using B-mode/gray scaled imaging, Doppler spectral analysis and color flow Doppler was  obtained of the carotid arteries. COMPARISON: None. HISTORY: ORDERING SYSTEM PROVIDED HISTORY: stroke like symptoms TECHNOLOGIST PROVIDED HISTORY: Reason for exam:->stroke like symptoms What reading provider will be dictating this exam?->CRC FINDINGS: RIGHT: The right common carotid artery demonstrates peak systolic velocities of 69, 68 cm/sec in the proximal and distal segments respectively. The right internal carotid artery demonstrates the systolic velocities of 55, 136, 78 cm/sec in the proximal, mid and distal segments respectively. The external carotid artery is patent. The vertebral artery demonstrates normal antegrade flow. Moderate plaque is noted in the carotid bulb and ICA ICA/CCA ratio of 2.0 LEFT: The left common carotid artery demonstrates peak systolic velocities of 590, 87 cm/sec in the proximal and distal segments respectively. The left internal carotid artery demonstrates the systolic velocities of 83, 59, 61 cm/sec in the proximal, mid and distal segments respectively. The external carotid artery is patent. The vertebral artery demonstrates normal antegrade flow. Minimal atherosclerotic plaque. ICA/CCA ratio of 1.0     The right internal carotid artery demonstrates 50-69% stenosis. The left internal carotid artery demonstrates 0-50% stenosis. Bilateral vertebral arteries are patent with flow in the normal direction.          Patient Instructions:   Current Discharge Medication List        CONTINUE these medications which have CHANGED    Details   atorvastatin (LIPITOR) 40 MG tablet Take 1 tablet by mouth nightly  Qty: 30 tablet, Refills: 0      clopidogrel (PLAVIX) 75 MG tablet Take 1 tablet by mouth daily  Qty: 30 tablet, Refills: 0      aspirin 81 MG EC tablet Take 1 tablet by mouth daily  Qty: 30 tablet, Refills: 5               Note  that  ***  minutes were spent in preparing discharge papers, discussing discharge with patient, medication review, etc.    NOTE: This report was transcribed using voice recognition software. Every effort was made to ensure accuracy; however, inadvertent computerized transcription errors may be present.      Signed:  Electronically signed by Brent Jackson MD on 2/6/2023 at 4:03 PM

## 2023-02-06 NOTE — CARE COORDINATION
2-6-Cm note: met with pt for transition of care, she is independent, no DME, no needs identified for dc , pt plans on going to her dtr's home at [place] and dtr will transport pt home. Per note pt came in from halfway, no officers at bedside, per [facility] Police pt is no longer a police hold.  Electronically signed by Hay Hood RN on 2/6/2023 at 10:20 AM

## 2023-02-06 NOTE — PROGRESS NOTES
SPEECH/LANGUAGE PATHOLOGY  CLINICAL ASSESSMENT OF SWALLOWING FUNCTION   and PLAN OF CARE    PATIENT NAME:  Amaya Wills  (female)     MRN:  [id number]    :  1970  (47 y.o.)  STATUS:  Inpatient: Room 0417/0417-01    TODAY'S DATE:  2023  REFERRING PROVIDER:     Speech Language Pathology (SLP) eval and treat  Start:  23,   End:  23,   ONE TIME,   Standing Count:  1 Occurrences,   R         Orpha Tab, DO   REASON FOR REFERRAL: dysphagia    EVALUATING THERAPIST: FELIBERTO Pretty                 RESULTS:    DYSPHAGIA DIAGNOSIS:   Clinical indicators of functional swallow for age/premorbid functioning      DIET RECOMMENDATIONS:  Regular consistency solids (IDDSI level 7) with  thin liquids (IDDSI level 0)     FEEDING RECOMMENDATIONS:     Assistance level:  No assistance needed      Compensatory strategies recommended: No strategies are recommended at this time      Discussed recommendations with nursing and/or faxed report to referring provider: No secondary to no diet/liquid change recommended     SPEECH THERAPY  PLAN OF CARE   The dysphagia POC is established based on physician order, dysphagia diagnosis and results of clinical assessment     Meal time assessment for 1-2 sessions to provide diet modification and compensatory strategy implementation due to patient report of dysphagia symptoms during meals    Conditions Requiring Skilled Therapeutic Intervention for dysphagia:    Coughing during PO intake at least once per meal     Specific dysphagia interventions to include:       ongoing mealtime assessment to provide diet modification and compensatory strategy implementation to minimize risk of aspiration associated with PO intake    Specific instructions for next treatment:  ongoing PO analysis to upgrade diet and evaluate tolerance of current PO recommendation  Patient Treatment Goals:    Short Term Goals:  Pt will participate in meal time assessment for 1-2 sessions to provide diet modification and compensatory strategy implementation due to patient report of dysphagia symptoms during meals      Long Term Goals:   Pt will maintain adequate nutrition/hydration via PO intake of the least restrictive oral diet with implementation of safe swallow/ compensatory strategies and decrease signs/symptoms of aspiration to less than 1 x/day. Patient/family Goal:    To eat/drink without coughing or choking    Plan of care discussed with Patient   The Patient understand(s) the diagnosis, prognosis and plan of care     Rehabilitation Potential/Prognosis: good                    ADMITTING DIAGNOSIS: Stroke-like symptom [R29.90]    VISIT DIAGNOSIS:   Visit Diagnoses         Codes    Cerebrovascular accident (CVA), unspecified mechanism ([facility]    -  Primary I63.9             PATIENT REPORT/COMPLAINT: occasional cough at least once per meal   RN cleared patient for participation in assessment     yes     PRIOR LEVEL OF SWALLOW FUNCTION:    PAST HISTORY OF DYSPHAGIA?: none reported    Home diet: Regular consistency solids (IDDSI level 7) with  thin liquids (IDDSI level 0)  Current Diet Order:  ADULT DIET; Regular    PROCEDURE:  Consistencies Administered During the Evaluation   Liquids: thin liquid   Solids:  Not given during eval. Had just finished meal.       Method of Intake:   cup  Self fed      Position:   Seated, upright    CLINICAL ASSESSMENT:  Oral Stage: The oral stage of swallowing was within functional limits for consistencies administered      Pharyngeal Stage:    No signs of aspiration were noted during this evaluation however, silent aspiration cannot be ruled out at bedside. If silent aspiration is suspected, a Videofluoroscopic Study of Swallowing (MBS) is recommended and requires a physician order.     Cognition:   Alert & Oriented x 3    Oral Peripheral Examination   Adequate lingual/labial strength     Current Respiratory Status    room air     Parameters of Speech Production  Respiration:  Adequate for speech production  Quality:   Within functional limits  Intensity: Within functional limits    Volitional Swallow: present     Volitional Cough:   present     Pain: No pain reported. EDUCATION:   The Speech Language Pathologist (SLP) completed education regarding results of evaluation and that intervention is warranted at this time. Learner: Patient  Education: Reviewed results and recommendations of this evaluation  Evaluation of Education:  Duane Aden understanding    This plan may be re-evaluated and revised as warranted. Evaluation Time includes thorough review of current medical information, gathering information on past medical history/social history and prior level of function, completion of standardized testing/informal observation of tasks, assessment of data and education on plan of care and goals. [x]The admitting diagnosis and active problem list, have been reviewed prior to initiation of this evaluation.         ACTIVE PROBLEM LIST:   Patient Active Problem List   Diagnosis    Pulmonary emphysema ([address]    Tobacco abuse    Morbid obesity with BMI of 40.0-44.9, adult ([address]    Hyperlipidemia    Symptomatic stenosis of left carotid artery    Cerebrovascular accident (CVA) due to occlusion of right posterior cerebral artery ([address]    Stroke (cerebrum) ([address]    Sinus bradycardia         CPT code:  40754  bedside swallow lucas Barrera All MSCCC/SLP  Speech Language Pathologist  [id number]

## 2023-02-06 NOTE — PROGRESS NOTES
SPEECH/LANGUAGE PATHOLOGY  SPEECH/LANGUAGE/COGNITIVE EVALUATION   and PLAN OF CARE      PATIENT NAME:  Philippe Yarbrough  (female)     MRN:  [id number]    :  1970  (47 y.o.)  STATUS:  Inpatient: Room 0417/0417-01    TODAY'S DATE:  2023  REFERRING PROVIDER:     Speech Language Pathology (SLP) eval and treat  Start:  23,   End:  23,   ONE TIME,   Standing Count:  1 Occurrences,   R         Karmen Rumcheri, DO    REASON FOR REFERRAL: stroke like symptoms   EVALUATING THERAPIST: FELIBERTO Novoa    ADMITTING DIAGNOSIS: Stroke-like symptom [R29.90]    VISIT DIAGNOSIS:   Visit Diagnoses         Codes    Cerebrovascular accident (CVA), unspecified mechanism ([facility]    -  Primary I63.9             SPEECH THERAPY  PLAN OF CARE   The speech therapy  POC is established based on physician order, speech pathology diagnosis and results of clinical assessment     SPEECH PATHOLOGY DIAGNOSIS:    Within function limits    Speech Pathology intervention is not warranted at this time. Conditions Requiring Skilled Therapeutic Intervention for speech, language and/or cognition  Not applicable     Specific Speech Therapy Interventions to Include:   Not applicable    Specific instructions for next treatment:    Not applicable    SHORT/LONG TERM GOALS  Not applicable      Patient goals: Patient/family involved in developing goals and treatment plan:   Treatment goals discussed with Patient    The Patient understand(s) the diagnosis, prognosis and plan of care   The patient/family Agreed with above,     This plan may be re-evaluated and revised as warranted.         Rehabilitation Potential/Prognosis: not applicable                CLINICAL ASSESSMENT:  MOTOR SPEECH       Oral Peripheral Examination   Adequate lingual/labial strength     Parameters of Speech Production  Respiration:  Adequate for speech production  Articulation:  Within functional limits  Resonance:  Within functional limits  Quality:   Within functional limits  Pitch: Within functional limits  Intensity: Within functional limits  Fluency:  Intact  Prosody Intact    RECEPTIVE LANGUAGE    Comprehension of Yes/No Questions: Within functional limits    Process  Simple Verbal Commands:   Within functional limits  Process Intermediate Verbal Commands:   Within functional limits  Process Complex Verbal Commands:     Within functional limits    Comprehension of Conversation:      Within functional limits      EXPRESSIVE LANGUAGE     Serials: Functional    Imitation:  Words   Functional   Sentences Functional    Naming:  (Modality used:  Verbal)  Confrontation Naming  Functional  Functional Description  Functional  Response Naming: Functional    Conversation:      Conversation was within functional limits    COGNITION     Attention/Orientation  Attention: Sustained attention   Orientation:  Oriented to Person, Place, Date, Reason for hospitalization    Memory   Immediate Recall: Repeated 3/3    Delayed Recall:   Recalled  3/3  Long Term Recall:   Recalled Address, Birthdate, Age, and Family    Organization/Problem Solving/Reasoning   Verbal Sequencing:   Functional        Verbal Problem solving:   Functional          CLINICAL OBSERVATIONS NOTED DURING THE EVALUATION  Within functional limits                  EDUCATION:   The Speech Language Pathologist (SLP) completed education regarding results of evaluation and that intervention is not warranted at this time. Learner: Patient  Education: Reviewed results and recommendations of this evaluation  Evaluation of Education:  Verbalizes understanding    Evaluation Time includes thorough review of current medical information, gathering information on past medical history/social history and prior level of function, completion of standardized testing/informal observation of tasks, assessment of data and education on plan of care and goals.       CPT code:    53494  eval speech sound lang comprehension      The admitting diagnosis and active problem list, as listed below have been reviewed prior to initiation of this evaluation.         ACTIVE PROBLEM LIST:   Patient Active Problem List   Diagnosis    Pulmonary emphysema ([address]    Tobacco abuse    Morbid obesity with BMI of 40.0-44.9, adult ([address]    Hyperlipidemia    Symptomatic stenosis of left carotid artery    Cerebrovascular accident (CVA) due to occlusion of right posterior cerebral artery ([address]    Stroke (cerebrum) ([address]    Sinus bradycardia       Riri Ponce MSCCC/SLP  Speech Language Pathologist  [id number]

## 2024-01-16 ENCOUNTER — TELEPHONE (OUTPATIENT)
Dept: CARDIOLOGY | Facility: CLINIC | Age: 54
End: 2024-01-16
Payer: COMMERCIAL

## 2024-01-16 NOTE — TELEPHONE ENCOUNTER
Called patient to schedule referral appointment. VM is full    [facility] - Carotid Aortic Stenosis, hx of CVA 2021, blood clot in heart - STAT Referral    MT 1/17/24 12:40pm Per Mignon

## 2024-02-06 ENCOUNTER — OFFICE VISIT (OUTPATIENT)
Dept: CARDIOLOGY | Facility: CLINIC | Age: 54
End: 2024-02-06
Payer: COMMERCIAL

## 2024-02-06 VITALS
WEIGHT: 203 LBS | DIASTOLIC BLOOD PRESSURE: 78 MMHG | BODY MASS INDEX: 35.97 KG/M2 | HEIGHT: 63 IN | HEART RATE: 64 BPM | SYSTOLIC BLOOD PRESSURE: 120 MMHG

## 2024-02-06 DIAGNOSIS — F17.210 CIGARETTE SMOKER: ICD-10-CM

## 2024-02-06 DIAGNOSIS — I73.9 PERIPHERAL VASCULAR DISEASE (CMS-HCC): ICD-10-CM

## 2024-02-06 DIAGNOSIS — Z86.73 HISTORY OF CEREBROVASCULAR ACCIDENT: ICD-10-CM

## 2024-02-06 DIAGNOSIS — I24.0 RCA OCCLUSION (MULTI): ICD-10-CM

## 2024-02-06 DIAGNOSIS — I63.9 CEREBROVASCULAR ACCIDENT (CVA), UNSPECIFIED MECHANISM (MULTI): ICD-10-CM

## 2024-02-06 DIAGNOSIS — F14.10: ICD-10-CM

## 2024-02-06 PROCEDURE — 93000 ELECTROCARDIOGRAM COMPLETE: CPT | Performed by: INTERNAL MEDICINE

## 2024-02-06 PROCEDURE — 99204 OFFICE O/P NEW MOD 45 MIN: CPT | Performed by: INTERNAL MEDICINE

## 2024-02-06 PROCEDURE — 4004F PT TOBACCO SCREEN RCVD TLK: CPT | Performed by: INTERNAL MEDICINE

## 2024-02-06 RX ORDER — ACETAMINOPHEN 325 MG/1
325 TABLET ORAL EVERY 4 HOURS PRN
COMMUNITY
Start: 2024-01-17

## 2024-02-06 RX ORDER — IBUPROFEN 800 MG/1
800 TABLET ORAL 4 TIMES DAILY
COMMUNITY
Start: 2024-01-17

## 2024-02-06 RX ORDER — ERGOCALCIFEROL 1.25 MG/1
1 CAPSULE ORAL
COMMUNITY
Start: 2024-01-16

## 2024-02-06 RX ORDER — NAPROXEN SODIUM 220 MG/1
81 TABLET, FILM COATED ORAL DAILY
Qty: 30 TABLET | Refills: 11 | Status: SHIPPED | OUTPATIENT
Start: 2024-02-06 | End: 2025-02-05

## 2024-02-06 RX ORDER — ATORVASTATIN CALCIUM 40 MG/1
40 TABLET, FILM COATED ORAL DAILY
COMMUNITY
Start: 2024-01-12

## 2024-02-06 RX ORDER — ROPINIROLE 1 MG/1
1 TABLET, FILM COATED ORAL NIGHTLY
COMMUNITY
Start: 2024-01-19

## 2024-02-06 RX ORDER — VENLAFAXINE HYDROCHLORIDE 37.5 MG/1
37.5 CAPSULE, EXTENDED RELEASE ORAL
COMMUNITY
Start: 2024-02-01

## 2024-02-06 RX ORDER — TRAZODONE HYDROCHLORIDE 50 MG/1
50 TABLET ORAL NIGHTLY
COMMUNITY
Start: 2024-01-12

## 2024-02-06 ASSESSMENT — ENCOUNTER SYMPTOMS: DIZZINESS: 1

## 2024-02-06 NOTE — PATIENT INSTRUCTIONS
Please bring all medicines, vitamins, and herbal supplements with you when you come to the office.    Prescriptions will not be filled unless you are compliant with your follow up appointments or have a follow up appointment scheduled as per instruction of your physician.   Refills should be requested at the time of your visit.

## 2024-02-06 NOTE — PROGRESS NOTES
Cardiology Consultation- New Consult    Reason for referral:     HPI: Angélica Naranjo is a 53 y.o. female seen in cardiology consultation at the request of herself and [facility] for further cardiovascular evaluation and post stroke management.  Patient has a history of 2 strokes in the past dating back to 2021 when she lived in [facility], eventually treated with left carotid endarterectomy.  Subsequently had 2 TIA events last of which was January 2023 when she was in drug rehab and treated and seen at [facility].  She eloped from the hospital prior to evaluation with neurologist.    Details of that hospitalization, neurology note and imaging are reviewed and her history is reviewed extensively as provided by those notes fortunately.    Patient has a history of crack cocaine use, currently living in a long-term house locally but intends to return to [facility] imminently to take up residence and return to primary care.  She has underlying obesity, the above-mentioned history of stroke in 2021 with left carotid revascularization, subsequent TIAs none recently, ongoing tobacco use, abstention from crack cocaine at the present time.    She denies any history of myocardial infarction, revascularization or thromboembolic disease, denies diabetes.    After review of her medications and her history; she is currently not on a antiplatelet agent.  [facility] neurology is reviewed and recommended aspirin 81 daily they did not recommend any further advanced antiplatelet therapy or DAPT.  Fortunately she she is abstaining from crack cocaine we did  her on tobacco cessation for 3 to 5 minutes today.    Today's ECG reveals normal sinus rhythm and is normal    Will reinitiate aspirin 81 daily, continue atorvastatin 40 daily, recommend exercise, dietary discretion and weight loss, and follow-up as needed with ourselves and follow-up routinely with primary care in [facility]      Past Medical  "History:   She has a past medical history of CVA (cerebral vascular accident) (CMS/HCC).    Surgical History:   She has a past surgical history that includes  section, classic; Tubal ligation; Gallbladder surgery; and Carotid angioplasty.    Family History:   Family History   Problem Relation Name Age of Onset    Diabetes type I Mother      COPD Mother      Other (heart problems) Mother      No Known Problems Father      No Known Problems Sister         Social History:   Social History     Tobacco Use    Smoking status: Some Days     Packs/day: .25     Types: Cigarettes    Smokeless tobacco: Never   Substance Use Topics    Alcohol use: Never        Allergies:  Patient has no known allergies.     Current Medications:    Current Outpatient Medications:     acetaminophen (Tylenol) 325 mg tablet, Take 1 tablet (325 mg) by mouth every 4 hours if needed., Disp: , Rfl:     atorvastatin (Lipitor) 40 mg tablet, Take 1 tablet (40 mg) by mouth once daily., Disp: , Rfl:     ergocalciferol (Vitamin D-2) 1.25 MG (77796 UT) capsule, Take 1 capsule (1,250 mcg) by mouth 1 (one) time per week., Disp: , Rfl:     ibuprofen 800 mg tablet, Take 1 tablet (800 mg) by mouth 4 times a day., Disp: , Rfl:     rOPINIRole (Requip) 1 mg tablet, Take 1 tablet (1 mg) by mouth once daily at bedtime., Disp: , Rfl:     traZODone (Desyrel) 50 mg tablet, Take 1 tablet (50 mg) by mouth once daily at bedtime., Disp: , Rfl:     venlafaxine XR (Effexor-XR) 37.5 mg 24 hr capsule, Take 1 capsule (37.5 mg) by mouth., Disp: , Rfl:      Vitals:  Visit Vitals  /78 (BP Location: Left arm, Patient Position: Sitting)   Pulse 64   Ht 1.6 m (5' 3\")   Wt 92.1 kg (203 lb)   BMI 35.96 kg/m²   Smoking Status Some Days   BSA 2.02 m²    EKG done in office today        Review of Systems   Cardiovascular:  Positive for chest pain.   Neurological:  Positive for dizziness.   All other systems reviewed and are negative.      Objective         Physical " Exam  Constitutional:       Appearance: Normal appearance. She is normal weight.   HENT:      Nose: Nose normal.   Neck:      Vascular: No carotid bruit.   Cardiovascular:      Rate and Rhythm: Normal rate.      Pulses: Normal pulses.      Heart sounds: Normal heart sounds.   Pulmonary:      Effort: Pulmonary effort is normal.   Abdominal:      General: Bowel sounds are normal.      Palpations: Abdomen is soft.   Genitourinary:     Rectum: Normal.   Musculoskeletal:         General: Normal range of motion.      Cervical back: Normal range of motion.      Right lower leg: No edema.      Left lower leg: No edema.   Skin:     General: Skin is warm and dry.   Neurological:      General: No focal deficit present.      Mental Status: She is alert.   Psychiatric:         Mood and Affect: Mood normal.         Behavior: Behavior normal.         Thought Content: Thought content normal.         Judgment: Judgment normal.              Scribe Attestation  By signing my name below, Jessica SINCLAIR LPN   , Scribe   attest that this documentation has been prepared under the direction and in the presence of El Overton DO.   Assessment and Plan:   1. Peripheral vascular disease (CMS/HCC)        2. Cerebrovascular accident (CVA), unspecified mechanism (CMS/HCC)        3. History of cerebrovascular accident        4. Cigarette smoker        5. Nondependent cocaine abuse (CMS/HCC)

## (undated) DEVICE — CLAMP INSERT: Brand: STEALTH® CLAMP INSERT

## (undated) DEVICE — DRESSING FOAM W22XL25CM FILVE LAYR FOAM DP DEF SAFETAC

## (undated) DEVICE — MAGNETIC INSTR DRAPE 20X16: Brand: MEDLINE INDUSTRIES, INC.

## (undated) DEVICE — 34" SINGLE PATIENT USE HOVERMATT BREATHABLE: Brand: SINGLE PATIENT USE HOVERMATT

## (undated) DEVICE — CATHETER URETH 22FR L16IN LTX INTMIT ROB MOD BARDX

## (undated) DEVICE — TOWEL,OR,DSP,ST,BLUE,STD,6/PK,12PK/CS: Brand: MEDLINE

## (undated) DEVICE — GLOVE SURG SZ 75 STD WHT LTX SYN POLYMER BEAD REINF ANTI RL

## (undated) DEVICE — LABEL MED 4 IN SURG PANEL W/ PEN STRL

## (undated) DEVICE — GOWN,SIRUS,FABRNF,XL,20/CS: Brand: MEDLINE

## (undated) DEVICE — NEEDLE HYPO 21GA L1.5IN GRN POLYPR HUB S STL REG BVL STR

## (undated) DEVICE — SOLUTION IV 500ML 0.9% SOD CHL PH 5 INJ USP VIAFLX PLAS

## (undated) DEVICE — CLOTH SURG PREP PREOPERATIVE CHLORHEXIDINE GLUC 2% READYPREP

## (undated) DEVICE — 3M™ IOBAN™ 2 ANTIMICROBIAL INCISE DRAPE 6640EZ: Brand: IOBAN™ 2

## (undated) DEVICE — Z DUP USE 2257490 ADHESIVE SKIN CLSRE 036ML TPCL 2CTL CNCRLTE HIGH VSCSTY DRMB

## (undated) DEVICE — GAUZE,SPONGE,4"X4",16PLY,XRAY,STRL,LF: Brand: MEDLINE

## (undated) DEVICE — 1.5L THIN WALL CAN: Brand: CRD

## (undated) DEVICE — GLOVE SURG SZ 65 THK91MIL LTX FREE SYN POLYISOPRENE

## (undated) DEVICE — DOUBLE BASIN SET: Brand: MEDLINE INDUSTRIES, INC.

## (undated) DEVICE — CAROTID ARTERY SHUNT KIT,RADIOPAQUE LINE, STRAIGHT: Brand: ARGYLE

## (undated) DEVICE — TOTAL TRAY, 16FR 10ML SIL FOLEY, URN: Brand: MEDLINE

## (undated) DEVICE — SOLUTION IV IRRIG WATER 1000ML POUR BRL 2F7114

## (undated) DEVICE — CATHETER ETER IV 20GA L1IN POLYUR STR RADPQ INTROCAN SFTY

## (undated) DEVICE — SOLUTION IV IRRIG POUR BRL 0.9% SODIUM CHL 2F7124

## (undated) DEVICE — NEEDLE HYPO 18GA L1.5IN PNK POLYPR HUB S STL REG BVL STR

## (undated) DEVICE — GOWN,AURORA,NONREINF,RAGLAN,L,STERILE: Brand: MEDLINE

## (undated) DEVICE — GLOVE SURG SZ 75 L12IN FNGR THK87MIL WHT LTX FREE

## (undated) DEVICE — SURGICAL PROCEDURE PACK VASC MAJ CUST

## (undated) DEVICE — PATIENT RETURN ELECTRODE, SINGLE-USE, CONTACT QUALITY MONITORING, ADULT, WITH 9FT CORD, FOR PATIENTS WEIGING OVER 33LBS. (15KG): Brand: MEGADYNE

## (undated) DEVICE — LOOP VES W25MM THK1MM MAXI RED SIL FLD REPELLENT 100 PER

## (undated) DEVICE — SET INSTR ART 1

## (undated) DEVICE — NEEDLE HYPO 26GA L0.625IN TAN POLYPR HUB S STL REG BVL STR

## (undated) DEVICE — PACK,LAPAROTOMY,NO GOWNS: Brand: MEDLINE

## (undated) DEVICE — SET SURG BASIN MAYO REUSABLE

## (undated) DEVICE — SET SURG INSTR ART III

## (undated) DEVICE — Z DISCONTINUED PER MEDLINE USE 2425483 TAPE UMB L30IN DIA1/8IN WHT COT NONABSORBABLE W/O NDL FOR